# Patient Record
Sex: FEMALE | Race: BLACK OR AFRICAN AMERICAN | NOT HISPANIC OR LATINO | Employment: FULL TIME | URBAN - METROPOLITAN AREA
[De-identification: names, ages, dates, MRNs, and addresses within clinical notes are randomized per-mention and may not be internally consistent; named-entity substitution may affect disease eponyms.]

---

## 2019-05-16 ENCOUNTER — OFFICE VISIT (OUTPATIENT)
Dept: FAMILY MEDICINE | Facility: CLINIC | Age: 39
End: 2019-05-16
Payer: COMMERCIAL

## 2019-05-16 VITALS
WEIGHT: 258 LBS | SYSTOLIC BLOOD PRESSURE: 141 MMHG | HEART RATE: 90 BPM | OXYGEN SATURATION: 99 % | DIASTOLIC BLOOD PRESSURE: 93 MMHG | BODY MASS INDEX: 39.1 KG/M2 | HEIGHT: 68 IN | TEMPERATURE: 99.3 F

## 2019-05-16 DIAGNOSIS — M79.662 PAIN OF LEFT LOWER LEG: Primary | ICD-10-CM

## 2019-05-16 PROCEDURE — 99213 OFFICE O/P EST LOW 20 MIN: CPT | Performed by: FAMILY MEDICINE

## 2019-05-16 ASSESSMENT — PAIN SCALES - GENERAL: PAINLEVEL: NO PAIN (0)

## 2019-05-16 ASSESSMENT — MIFFLIN-ST. JEOR: SCORE: 1893.78
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
## 2019-05-16 NOTE — PATIENT INSTRUCTIONS
================================================================================  Normal Values   Blood pressure  <140/90 for most adults    <130/80 for some chronic diseases (ask your care team about yours)    BMI (body mass index)  18.5-25 kg/m2 (based on height and weight)     Thank you for visiting [facility]    Normal or non-critical lab and imaging results will be communicated to you by MyChart, letter or phone within 7 days.  If you do not hear from us within 10 days, please call the clinic. If you have a critical or abnormal lab result, we will notify you by phone as soon as possible.     If you have any questions regarding your visit please contact:     Team Comfort:   Clinic Hours Telephone Number   Dr. Davey Smith Dr. Vocal 7am-5pm  Monday - Friday [phone]  Juan RN  Charla RN  Cheyenne RN   Pharmacy 8:00am-8pm Monday-Friday    9am-5pm Saturday-Sunday [phone]   Urgent Care 11am-9pm Monday-Friday        9am-5pm Saturday-Sunday [phone]     After hours, weekend or if you need to make an appointment with your primary provider please call [phone].   After Hours nurse advise: call [facility]: [phone]    Medication Refills:  Call your pharmacy and they will forward the refill to us. Please allow 3 business days for your refills to be completed.
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
## 2019-05-22 ENCOUNTER — ANCILLARY PROCEDURE (OUTPATIENT)
Dept: GENERAL RADIOLOGY | Facility: CLINIC | Age: 39
End: 2019-05-22
Attending: ORTHOPAEDIC SURGERY
Payer: COMMERCIAL

## 2019-05-22 ENCOUNTER — OFFICE VISIT (OUTPATIENT)
Dept: ORTHOPEDICS | Facility: CLINIC | Age: 39
End: 2019-05-22
Payer: COMMERCIAL

## 2019-05-22 VITALS — OXYGEN SATURATION: 99 % | SYSTOLIC BLOOD PRESSURE: 148 MMHG | HEART RATE: 82 BPM | DIASTOLIC BLOOD PRESSURE: 86 MMHG

## 2019-05-22 DIAGNOSIS — M79.605 PAIN OF LEFT LOWER EXTREMITY: ICD-10-CM

## 2019-05-22 DIAGNOSIS — S82.401Q: Primary | ICD-10-CM

## 2019-05-22 DIAGNOSIS — S82.201Q: Primary | ICD-10-CM

## 2019-05-22 PROCEDURE — 73590 X-RAY EXAM OF LOWER LEG: CPT | Mod: LT

## 2019-05-22 PROCEDURE — 99243 OFF/OP CNSLTJ NEW/EST LOW 30: CPT | Performed by: ORTHOPAEDIC SURGERY

## 2019-05-22 ASSESSMENT — PAIN SCALES - GENERAL: PAINLEVEL: MODERATE PAIN (4)
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
## 2019-05-22 NOTE — NURSING NOTE
Agustina to follow up with Primary Care provider regarding elevated blood pressure.  Luke PERDOMO

## 2019-06-19 ENCOUNTER — HOSPITAL ENCOUNTER (OUTPATIENT)
Dept: NUCLEAR MEDICINE | Facility: CLINIC | Age: 39
Setting detail: NUCLEAR MEDICINE
Discharge: HOME | End: 2019-06-19
Attending: ORTHOPAEDIC SURGERY | Admitting: ORTHOPAEDIC SURGERY
Payer: COMMERCIAL

## 2019-06-19 ENCOUNTER — HOSPITAL ENCOUNTER (OUTPATIENT)
Dept: NUCLEAR MEDICINE | Facility: CLINIC | Age: 39
Setting detail: NUCLEAR MEDICINE
End: 2019-06-19
Attending: ORTHOPAEDIC SURGERY
Payer: COMMERCIAL

## 2019-06-19 DIAGNOSIS — M79.605 PAIN OF LEFT LOWER EXTREMITY: ICD-10-CM

## 2019-06-19 PROCEDURE — A9503 TC99M MEDRONATE: HCPCS | Performed by: ORTHOPAEDIC SURGERY

## 2019-06-19 PROCEDURE — 78315 BONE IMAGING 3 PHASE: CPT

## 2019-06-19 PROCEDURE — 34300033 ZZH RX 343: Performed by: ORTHOPAEDIC SURGERY

## 2019-06-19 RX ORDER — TC 99M MEDRONATE 20 MG/10ML
20-30 INJECTION, POWDER, LYOPHILIZED, FOR SOLUTION INTRAVENOUS ONCE
Status: COMPLETED | OUTPATIENT
Start: 2019-06-19 | End: 2019-06-19

## 2019-06-19 RX ADMIN — TC 99M MEDRONATE 24.1 MCI.: 20 INJECTION, POWDER, LYOPHILIZED, FOR SOLUTION INTRAVENOUS at 08:06

## 2020-03-11 ENCOUNTER — HEALTH MAINTENANCE LETTER (OUTPATIENT)
Age: 40
End: 2020-03-11

## 2020-03-19 ENCOUNTER — TELEPHONE (OUTPATIENT)
Dept: FAMILY MEDICINE | Facility: CLINIC | Age: 40
End: 2020-03-19

## 2020-03-19 NOTE — TELEPHONE ENCOUNTER
Patient contacted change appointment over to a telephone visit and get more information as the last office visit and images were done in May of 2019.    Patient picked up and handed phone .  states patient was seen by Dr Smith last year and referred to another doctor then that doctor referred patient to the [facility]. An MRI was completed 2 months there and they wanted Dr Smith to over the results and discuss plan. When trying to explain and get more information which facility they saw for the specialist and facility MRI was completed,  kept complaining about we referred patient and should know and have all the information already. Writer explained to  that we do not see referral to the [facility] or another facility to be able to further assist them. Writer also told  that the doctor who referred, which was the specialist would get the MRI not Dr Smith. Since they have been seeing the specialist until now, they should be able to assist our clinic and provide me the name and facility or specialist and MRI.  finally agreed to check and call clinic back.    Justyn Sargent CMA

## 2020-03-19 NOTE — TELEPHONE ENCOUNTER
Writer contacted patient and  picked up.  does not have information yet but will try to get information and get back to us so we can get records and go over everything better. Appointment today okay to cancel per patient. We will reschedule once get records.    Justyn Sargent CMA

## 2020-12-26 ENCOUNTER — RECORDS - HEALTHEAST (OUTPATIENT)
Dept: LAB | Facility: CLINIC | Age: 40
End: 2020-12-26

## 2020-12-26 LAB
SARS-COV-2 PCR COMMENT: NORMAL
SARS-COV-2 RNA SPEC QL NAA+PROBE: NEGATIVE
SARS-COV-2 VIRUS SPECIMEN SOURCE: NORMAL

## 2021-01-03 ENCOUNTER — HEALTH MAINTENANCE LETTER (OUTPATIENT)
Age: 41
End: 2021-01-03

## 2021-01-27 ENCOUNTER — OFFICE VISIT (OUTPATIENT)
Dept: FAMILY MEDICINE | Facility: CLINIC | Age: 41
End: 2021-01-27
Payer: COMMERCIAL

## 2021-01-27 VITALS
BODY MASS INDEX: 41.36 KG/M2 | HEART RATE: 94 BPM | OXYGEN SATURATION: 98 % | RESPIRATION RATE: 16 BRPM | WEIGHT: 272 LBS | TEMPERATURE: 98 F | DIASTOLIC BLOOD PRESSURE: 94 MMHG | SYSTOLIC BLOOD PRESSURE: 132 MMHG

## 2021-01-27 DIAGNOSIS — I10 ESSENTIAL HYPERTENSION: Primary | ICD-10-CM

## 2021-01-27 DIAGNOSIS — M79.662 PAIN OF LEFT LOWER LEG: ICD-10-CM

## 2021-01-27 DIAGNOSIS — E66.01 MORBID OBESITY (H): ICD-10-CM

## 2021-01-27 LAB
ANION GAP SERPL CALCULATED.3IONS-SCNC: 4 MMOL/L (ref 3–14)
BUN SERPL-MCNC: 14 MG/DL (ref 7–30)
CALCIUM SERPL-MCNC: 9.3 MG/DL (ref 8.5–10.1)
CHLORIDE SERPL-SCNC: 106 MMOL/L (ref 94–109)
CHOLEST SERPL-MCNC: 264 MG/DL
CO2 SERPL-SCNC: 30 MMOL/L (ref 20–32)
CREAT SERPL-MCNC: 0.72 MG/DL (ref 0.52–1.04)
CREAT UR-MCNC: 79 MG/DL
GFR SERPL CREATININE-BSD FRML MDRD: >90 ML/MIN/{1.73_M2}
GLUCOSE SERPL-MCNC: 95 MG/DL (ref 70–99)
HBA1C MFR BLD: 5.6 % (ref 0–5.6)
HDLC SERPL-MCNC: 68 MG/DL
LDLC SERPL CALC-MCNC: 178 MG/DL
MICROALBUMIN UR-MCNC: 10 MG/L
MICROALBUMIN/CREAT UR: 12.57 MG/G CR (ref 0–25)
NONHDLC SERPL-MCNC: 196 MG/DL
POTASSIUM SERPL-SCNC: 3.6 MMOL/L (ref 3.4–5.3)
SODIUM SERPL-SCNC: 140 MMOL/L (ref 133–144)
TRIGL SERPL-MCNC: 92 MG/DL

## 2021-01-27 PROCEDURE — 80061 LIPID PANEL: CPT | Performed by: PREVENTIVE MEDICINE

## 2021-01-27 PROCEDURE — 82043 UR ALBUMIN QUANTITATIVE: CPT | Performed by: PREVENTIVE MEDICINE

## 2021-01-27 PROCEDURE — 36415 COLL VENOUS BLD VENIPUNCTURE: CPT | Performed by: PREVENTIVE MEDICINE

## 2021-01-27 PROCEDURE — 83036 HEMOGLOBIN GLYCOSYLATED A1C: CPT | Performed by: PREVENTIVE MEDICINE

## 2021-01-27 PROCEDURE — 99214 OFFICE O/P EST MOD 30 MIN: CPT | Performed by: PREVENTIVE MEDICINE

## 2021-01-27 PROCEDURE — 80048 BASIC METABOLIC PNL TOTAL CA: CPT | Performed by: PREVENTIVE MEDICINE

## 2021-01-27 RX ORDER — AMLODIPINE BESYLATE 5 MG/1
5 TABLET ORAL DAILY
Qty: 90 TABLET | Refills: 0 | Status: SHIPPED | OUTPATIENT
Start: 2021-01-27 | End: 2021-04-16 | Stop reason: DRUGHIGH

## 2021-01-27 ASSESSMENT — PAIN SCALES - GENERAL: PAINLEVEL: NO PAIN (0)

## 2021-01-27 NOTE — RESULT ENCOUNTER NOTE
Agustina,     Urine sample is not showing any abnormal protein.     Please do not hesitate to call us at [phone] if you have any questions or concerns.    Thank you,    Aleyda Briggs MD MPH

## 2021-01-27 NOTE — RESULT ENCOUNTER NOTE
Agustina,    Three month glucose number is normal, you do not have diabetes. Other results are pending.     Please do not hesitate to call us at [phone] if you have any questions or concerns.    Thank you,    Aleyda Briggs MD MPH

## 2021-01-27 NOTE — RESULT ENCOUNTER NOTE
Dear Agustina Julian    Here are your cholesterol results:    Your LDL is: Lab Results       Component                Value               Date                       LDL                      178                 01/27/2021          Your LDL goal is to be less than 130  Your HDL is: Lab Results       Component                Value               Date                       HDL                      68                  01/27/2021           Goal HDL is Greater than 40 (for men) or 50 (for women).  Your Triglycerides are: Lab Results       Component                Value               Date                       TRIG                     92                  01/27/2021          Goal TRIGLYCERIDES are less than 150.       Here are some ways to improve your cholesterol without medication:    Try to get at least 45 minutes of aerobic exercise 5-6 days a week  Maintain a healthy body weight  Eat less saturated fats  Buy lean cuts of meat, reduce your portions of red meat or substitute poultry or fish  Avoid fried or fast foods that are high in fat  Eat more fruits and vegetables      We should recheck cholesterol in 6 months, if still high then medication may be needed.   Electrolytes, glucose and kidney function are normal.        Please do not hesitate to call us at [phone] if you have any questions or concerns.    Thank you,    Aleyda Briggs MD MPH
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
## 2021-02-02 ENCOUNTER — RECORDS - HEALTHEAST (OUTPATIENT)
Dept: LAB | Facility: CLINIC | Age: 41
End: 2021-02-02

## 2021-02-09 ENCOUNTER — RECORDS - HEALTHEAST (OUTPATIENT)
Dept: LAB | Facility: CLINIC | Age: 41
End: 2021-02-09
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
## 2021-02-10 ENCOUNTER — OFFICE VISIT (OUTPATIENT)
Dept: ORTHOPEDICS | Facility: CLINIC | Age: 41
End: 2021-02-10
Payer: COMMERCIAL

## 2021-02-10 VITALS — HEART RATE: 64 BPM | SYSTOLIC BLOOD PRESSURE: 142 MMHG | OXYGEN SATURATION: 100 % | DIASTOLIC BLOOD PRESSURE: 84 MMHG

## 2021-02-10 DIAGNOSIS — M79.605 PAIN OF LEFT LOWER EXTREMITY: ICD-10-CM

## 2021-02-10 DIAGNOSIS — M79.662 PAIN OF LEFT LOWER LEG: ICD-10-CM

## 2021-02-10 DIAGNOSIS — S82.202P CLOSED FRACTURE OF SHAFT OF LEFT TIBIA WITH MALUNION, UNSPECIFIED FRACTURE MORPHOLOGY, SUBSEQUENT ENCOUNTER: Primary | ICD-10-CM

## 2021-02-10 PROCEDURE — 99213 OFFICE O/P EST LOW 20 MIN: CPT | Performed by: ORTHOPAEDIC SURGERY

## 2021-02-10 ASSESSMENT — PAIN SCALES - GENERAL: PAINLEVEL: MODERATE PAIN (5)

## 2021-02-10 NOTE — NURSING NOTE
Agustina to follow up with Primary Care provider regarding elevated blood pressure.    Luke PERDOMO
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
## 2021-02-16 ENCOUNTER — RECORDS - HEALTHEAST (OUTPATIENT)
Dept: LAB | Facility: CLINIC | Age: 41
End: 2021-02-16

## 2021-02-17 ENCOUNTER — TELEPHONE (OUTPATIENT)
Dept: ORTHOPEDICS | Facility: CLINIC | Age: 41
End: 2021-02-17

## 2021-02-17 DIAGNOSIS — S82.202P CLOSED FRACTURE OF SHAFT OF LEFT TIBIA WITH MALUNION, UNSPECIFIED FRACTURE MORPHOLOGY, SUBSEQUENT ENCOUNTER: Primary | ICD-10-CM

## 2021-02-17 NOTE — TELEPHONE ENCOUNTER
[facility] Call Center    Phone Message    May a detailed message be left on voicemail: yes     Reason for Call: Other:  is no longer with us at the [facility]. Patient  would like to know if you had another recommendation on who pt can see for her leg pain.      Action Taken: Other: ORTHO    Travel Screening: Not Applicable
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
## 2021-02-23 ENCOUNTER — RECORDS - HEALTHEAST (OUTPATIENT)
Dept: LAB | Facility: CLINIC | Age: 41
End: 2021-02-23

## 2021-02-24 DIAGNOSIS — M79.662 PAIN OF LEFT LOWER LEG: Primary | ICD-10-CM

## 2021-03-16 ENCOUNTER — RECORDS - HEALTHEAST (OUTPATIENT)
Dept: LAB | Facility: CLINIC | Age: 41
End: 2021-03-16

## 2021-03-23 ENCOUNTER — OFFICE VISIT (OUTPATIENT)
Dept: ORTHOPEDICS | Facility: CLINIC | Age: 41
End: 2021-03-23
Payer: COMMERCIAL

## 2021-03-23 ENCOUNTER — TELEPHONE (OUTPATIENT)
Dept: ORTHOPEDICS | Facility: CLINIC | Age: 41
End: 2021-03-23

## 2021-03-23 ENCOUNTER — ANCILLARY PROCEDURE (OUTPATIENT)
Dept: GENERAL RADIOLOGY | Facility: CLINIC | Age: 41
End: 2021-03-23
Attending: ORTHOPAEDIC SURGERY
Payer: COMMERCIAL

## 2021-03-23 VITALS — BODY MASS INDEX: 36.37 KG/M2 | HEIGHT: 68 IN | WEIGHT: 240 LBS

## 2021-03-23 DIAGNOSIS — M79.662 PAIN OF LEFT LOWER LEG: ICD-10-CM

## 2021-03-23 DIAGNOSIS — S82.202P CLOSED FRACTURE OF SHAFT OF LEFT TIBIA WITH MALUNION, UNSPECIFIED FRACTURE MORPHOLOGY, SUBSEQUENT ENCOUNTER: Primary | ICD-10-CM

## 2021-03-23 PROCEDURE — 99204 OFFICE O/P NEW MOD 45 MIN: CPT | Performed by: ORTHOPAEDIC SURGERY

## 2021-03-23 PROCEDURE — 73590 X-RAY EXAM OF LOWER LEG: CPT | Mod: LT | Performed by: RADIOLOGY

## 2021-03-23 ASSESSMENT — MIFFLIN-ST. JEOR: SCORE: 1802.13

## 2021-03-23 ASSESSMENT — PAIN SCALES - GENERAL: PAINLEVEL: NO PAIN (0)

## 2021-03-23 NOTE — TELEPHONE ENCOUNTER
----- Message from Emely Lama ATC sent at 3/23/2021  9:09 AM CDT -----  Regarding: CT scan  Please call patient and help her get scheduled for CT scan within the next week. We need the scan prior to surgery.    Thanks Emely     
Alert and oriented, no focal deficits, no motor or sensory deficits.
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
## 2021-03-23 NOTE — NURSING NOTE
Teaching Flowsheet   Relevant Diagnosis: Left tibia/fibula malunion  Teaching Topic: Left tibia and fibula osteotomy.    Patient presents with her  Frankie. Health history positive only for HTN on amlodipine.      Person(s) involved in teaching:   Patient and      Motivation Level:  Asks Questions: Yes  Eager to Learn: Yes  Cooperative: Yes  Receptive (willing/able to accept information): Yes  Any cultural factors/Voodoo beliefs that may influence understanding or compliance? No     Patient and Family demonstrates understanding of the following:  Reason for the appointment, diagnosis and treatment plan: Yes  Knowledge of proper use of medications and conditions for which they are ordered (with special attention to potential side effects or drug interactions): Yes  Which situations necessitate calling provider and whom to contact: Yes     Teaching Concerns Addressed: They understand patient will need a preop exam within 30 days of the date of surgery. They understand she will need a COVID test 3-4 days prior to surgery and our COVID team will reach out to 7 days before to help her schedule.    Proper use and care of assistive devices, crutches (medical equip, care aids, etc.): Yes  Nutritional needs and diet plan: Yes  Pain management techniques: Yes  Wound Care: Yes  How and/when to access community resources: Yes     Instructional Materials Used/Given: Preoperative teaching packet, surgical soap x2.

## 2021-03-25 ENCOUNTER — ANCILLARY PROCEDURE (OUTPATIENT)
Dept: CT IMAGING | Facility: CLINIC | Age: 41
End: 2021-03-25
Attending: ORTHOPAEDIC SURGERY
Payer: COMMERCIAL

## 2021-03-25 DIAGNOSIS — S82.202P CLOSED FRACTURE OF SHAFT OF LEFT TIBIA WITH MALUNION, UNSPECIFIED FRACTURE MORPHOLOGY, SUBSEQUENT ENCOUNTER: ICD-10-CM

## 2021-03-25 PROCEDURE — 73700 CT LOWER EXTREMITY W/O DYE: CPT | Mod: LT | Performed by: RADIOLOGY

## 2021-03-30 ENCOUNTER — RECORDS - HEALTHEAST (OUTPATIENT)
Dept: LAB | Facility: CLINIC | Age: 41
End: 2021-03-30

## 2021-04-05 ENCOUNTER — PREP FOR PROCEDURE (OUTPATIENT)
Dept: ORTHOPEDICS | Facility: CLINIC | Age: 41
End: 2021-04-05

## 2021-04-05 ENCOUNTER — TELEPHONE (OUTPATIENT)
Dept: ORTHOPEDICS | Facility: CLINIC | Age: 41
End: 2021-04-05

## 2021-04-05 DIAGNOSIS — S82.202P CLOSED FRACTURE OF SHAFT OF LEFT TIBIA WITH MALUNION, UNSPECIFIED FRACTURE MORPHOLOGY, SUBSEQUENT ENCOUNTER: Primary | ICD-10-CM

## 2021-04-05 NOTE — TELEPHONE ENCOUNTER
Patient is scheduled for surgery with Dr. Santiago    Spoke with: Patient's Frankie    Date of Surgery: 5/12/21    Location: [facility]    Post ops: 2 & 6 weeks    Pre-op with surgeon (if applicable): Complete    H&P: Patient will schedule with PCP, Candi LEROY    Pre-procedure COVID-19 Test: Patient will schedule with Candi LEROY    Additional imaging/appointments: N/A    Surgery packet: Received in clinic     Additional comments: N/A

## 2021-04-06 ENCOUNTER — RECORDS - HEALTHEAST (OUTPATIENT)
Dept: LAB | Facility: CLINIC | Age: 41
End: 2021-04-06

## 2021-04-06 DIAGNOSIS — S82.202P CLOSED FRACTURE OF SHAFT OF LEFT TIBIA WITH MALUNION, UNSPECIFIED FRACTURE MORPHOLOGY, SUBSEQUENT ENCOUNTER: Primary | ICD-10-CM

## 2021-04-13 ENCOUNTER — RECORDS - HEALTHEAST (OUTPATIENT)
Dept: LAB | Facility: CLINIC | Age: 41
End: 2021-04-13

## 2021-04-15 ENCOUNTER — TELEPHONE (OUTPATIENT)
Dept: ORTHOPEDICS | Facility: CLINIC | Age: 41
End: 2021-04-15

## 2021-04-15 ENCOUNTER — TELEPHONE (OUTPATIENT)
Dept: FAMILY MEDICINE | Facility: CLINIC | Age: 41
End: 2021-04-15

## 2021-04-15 DIAGNOSIS — M79.662 PAIN OF LEFT LOWER LEG: Primary | ICD-10-CM

## 2021-04-15 DIAGNOSIS — S82.202P CLOSED FRACTURE OF SHAFT OF LEFT TIBIA WITH MALUNION, UNSPECIFIED FRACTURE MORPHOLOGY, SUBSEQUENT ENCOUNTER: ICD-10-CM

## 2021-04-15 NOTE — TELEPHONE ENCOUNTER
RN called patient and told patient the Covid 19 order is placed.    Shashank Cedeño RN      [facility] Call Center    Phone Message    May a detailed message be left on voicemail: yes     Reason for Call: Other: patient is needing Pre surgical Covid orders to be entered, so that they can make the appt to be tested prior to surgery. Please call Frankie at [phone] once completed.      Action Taken: Other: Ortho    Travel Screening: Not Applicable

## 2021-04-15 NOTE — TELEPHONE ENCOUNTER
Patient stated that she has a pre-op Friday and would like to get her covid-19 done, patient would like the orders place so she can schedule it.

## 2021-04-15 NOTE — TELEPHONE ENCOUNTER
Called and left a voicemail message for patient to contact her surgeon's office to make arrangements for the order for testing.  Lin Nielsen [facility]  2nd Floor  Primary Care

## 2021-04-16 ENCOUNTER — OFFICE VISIT (OUTPATIENT)
Dept: FAMILY MEDICINE | Facility: CLINIC | Age: 41
End: 2021-04-16
Payer: COMMERCIAL

## 2021-04-16 VITALS
HEART RATE: 95 BPM | DIASTOLIC BLOOD PRESSURE: 94 MMHG | OXYGEN SATURATION: 97 % | RESPIRATION RATE: 16 BRPM | HEIGHT: 68 IN | WEIGHT: 271 LBS | TEMPERATURE: 98.5 F | SYSTOLIC BLOOD PRESSURE: 148 MMHG | BODY MASS INDEX: 41.07 KG/M2

## 2021-04-16 DIAGNOSIS — Z01.818 PREOP GENERAL PHYSICAL EXAM: Primary | ICD-10-CM

## 2021-04-16 DIAGNOSIS — I10 ESSENTIAL HYPERTENSION: ICD-10-CM

## 2021-04-16 DIAGNOSIS — E66.01 MORBID OBESITY (H): ICD-10-CM

## 2021-04-16 DIAGNOSIS — S82.202P CLOSED FRACTURE OF SHAFT OF LEFT TIBIA WITH MALUNION, UNSPECIFIED FRACTURE MORPHOLOGY, SUBSEQUENT ENCOUNTER: ICD-10-CM

## 2021-04-16 LAB
ANION GAP SERPL CALCULATED.3IONS-SCNC: 2 MMOL/L (ref 3–14)
BUN SERPL-MCNC: 11 MG/DL (ref 7–30)
CALCIUM SERPL-MCNC: 9.3 MG/DL (ref 8.5–10.1)
CHLORIDE SERPL-SCNC: 103 MMOL/L (ref 94–109)
CO2 SERPL-SCNC: 29 MMOL/L (ref 20–32)
CREAT SERPL-MCNC: 0.7 MG/DL (ref 0.52–1.04)
GFR SERPL CREATININE-BSD FRML MDRD: >90 ML/MIN/{1.73_M2}
GLUCOSE SERPL-MCNC: 108 MG/DL (ref 70–99)
HGB BLD-MCNC: 12.9 G/DL (ref 11.7–15.7)
POTASSIUM SERPL-SCNC: 3.7 MMOL/L (ref 3.4–5.3)
SODIUM SERPL-SCNC: 134 MMOL/L (ref 133–144)

## 2021-04-16 PROCEDURE — 99214 OFFICE O/P EST MOD 30 MIN: CPT | Performed by: PREVENTIVE MEDICINE

## 2021-04-16 PROCEDURE — 80048 BASIC METABOLIC PNL TOTAL CA: CPT | Performed by: PREVENTIVE MEDICINE

## 2021-04-16 PROCEDURE — 36415 COLL VENOUS BLD VENIPUNCTURE: CPT | Performed by: PREVENTIVE MEDICINE

## 2021-04-16 PROCEDURE — 85018 HEMOGLOBIN: CPT | Performed by: PREVENTIVE MEDICINE

## 2021-04-16 RX ORDER — AMLODIPINE BESYLATE 5 MG/1
5 TABLET ORAL DAILY
Qty: 90 TABLET | Refills: 0 | Status: CANCELLED | OUTPATIENT
Start: 2021-04-16

## 2021-04-16 RX ORDER — AMLODIPINE BESYLATE 10 MG/1
10 TABLET ORAL DAILY
Qty: 90 TABLET | Refills: 1 | Status: SHIPPED | OUTPATIENT
Start: 2021-04-16 | End: 2021-10-20

## 2021-04-16 ASSESSMENT — MIFFLIN-ST. JEOR: SCORE: 1942.75

## 2021-04-16 ASSESSMENT — PAIN SCALES - GENERAL: PAINLEVEL: NO PAIN (0)

## 2021-04-16 NOTE — RESULT ENCOUNTER NOTE
Agustina,     Electrolytes, non fasting glucose and kidney function are normal.     Please do not hesitate to call us at [phone] if you have any questions or concerns.    Thank you,    Aleyda Briggs MD MPH

## 2021-04-16 NOTE — PATIENT INSTRUCTIONS
At [facility], we strive to deliver an exceptional experience to you, every time we see you. If you receive a survey, please complete it as we do value your feedback.  If you have MyChart, you can expect to receive results automatically within 24 hours of their completion.  Your provider will send a note interpreting your results as well.   If you do not have MyChart, you should receive your results in about a week by mail.    Your care team:                            Family Medicine Internal Medicine   MD Anish Portillo MD Shantel Branch-Fleming, MD Srinivasa Vaka, MD Katya Belousova, PAZULY Barone, APRN CNP    Michelet Smith, MD Pediatrics   Kaushik Stephen, PAZULY Mccoy, CNP MD Shannan Dupree APRN CNP   MD Cammie Khan MD Deborah Mielke, MD Silke Escobar, APRN [facility]      Clinic hours: Monday - Thursday 7 am-6 pm; Fridays 7 am-5 pm.   Urgent care: Monday - Friday 10 am- 8 pm; Saturday and Sunday 9 am-5 pm.    Clinic: [phone]       [facility]: Monday - Thursday 8 am - 7 pm; Friday 8 am - 6 pm  [facility]: [phone]     Use [email or web address] for 24/7 diagnosis and treatment of dozens of conditions.    Preparing for Your Surgery  Getting started  A nurse will call you to review your health history and instructions. They will give you an arrival time based on your scheduled surgery time.  Please be ready to share the following:    Your doctor's clinic name and phone number    Your medical, surgical and anesthesia history    A list of allergies and sensitivities    A list of medicines, including herbal treatments and over-the-counter drugs    Whether the patient has a legal guardian (ask how to send us the papers in advance)  If you have a child who's having surgery, please ask for a copy of Preparing for Your Child's Surgery.    Preparing for  surgery    Within 30 days of surgery: Have a pre-op exam (sometimes called an H&P, or History and Physical). This can be done at a clinic or pre-operative center.  ? If you're having a , you may not need this exam. Talk to your care team    At your pre-op exam, talk to your care team about all medicines you take. If you need to stop any medicines before surgery, ask when to start taking them again.  ? We do this for your safety. Many medicines can make you bleed too much during surgery. Some change how well surgery (anesthesia) drugs work.    Call your insurance company to let them know you're having surgery. (If you don't have insurance, call [phone].)    Call your clinic if there's any change in your health. This includes signs of a cold or flu (sore throat, runny nose, cough, rash, fever). It also includes a scrape or scratch near the surgery site.    If you have questions on the day of surgery, call your hospital or surgery center.  Eating and drinking guidelines  For your safety: Unless your surgeon tells you otherwise, follow the guidelines below.    Eat and drink as usual until 8 hours before surgery. After that, no food or milk.    Drink clear liquids until 2 hours before surgery. These are liquids you can see through, like water, Gatorade and Propel Water. You may also have black coffee and tea (no cream or milk).    Nothing by mouth within 2 hours of surgery. This includes gum, candy and breath mints.    If you drink, stop drinking alcohol the night before surgery.    If your care team tells you to take medicine on the morning of surgery, it's okay to take it with a sip of water.  Preventing infection    Shower or bathe the night before and morning of your surgery. Follow the instructions your clinic gave you. (If no instructions, use regular soap.)    Don't shave or clip hair near your surgery site. We'll remove the hair if needed.    Don't smoke or vape the morning of surgery. You may chew  nicotine gum up to 2 hours before surgery. A nicotine patch is okay.  ? Note: Some surgeries require you to completely quit smoking and nicotine. Check with your surgeon.    Your care team will make every effort to keep you safe from infection. We will:  ? Clean our hands often with soap and water (or an alcohol-based hand rub).  ? Clean the skin at your surgery site with a special soap that kills germs.  ? Give you a special gown to keep you warm. (Cold raises the risk of infection.)  ? Wear special hair covers, masks, gowns and gloves during surgery.  ? Give antibiotic medicine, if prescribed. Not all surgeries need antibiotics.  What to bring on the day of surgery    Photo ID and insurance card    Copy of your health care directive, if you have one    Glasses and hearing aides (bring cases)  ? You can't wear contacts during surgery    Inhaler and eye drops, if you use them (tell us about these when you arrive)    CPAP machine or breathing device, if you use them    A few personal items, if spending the night    If you have . . .  ? A pacemaker or ICD (cardiac defibrillator): Bring the ID card.  ? An implanted stimulator: Bring the remote control.  ? A legal guardian: Bring a copy of the certified (court-stamped) guardianship papers.  Please remove any jewelry, including body piercings. Leave jewelry and other valuables at home.  If you're going home the day of surgery  Important: If you don't follow the rules below, we must cancel your surgery.     Arrange for someone to drive you home after surgery. You may not drive, take a taxi or take public transportation by yourself (unless you'll have local anesthesia only).    Arrange for a responsible adult to stay with you overnight. If you don't, we may keep you in the hospital overnight, and you may need to pay the costs yourself.  Questions?   If you have any questions for your care team, list them here:  _________________________________________________________________________________________________________________________________________________________________________________________________________________________________________________________________________________________________________________________  For informational purposes only. Not to replace the advice of your health care provider. Copyright   2003, 2019 [facility]. All rights reserved. Clinically reviewed by Fawn Almodovar MD. SMARTworks 211406 - REV 4/20.
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
## 2021-04-16 NOTE — RESULT ENCOUNTER NOTE
Agustina,     Hemoglobin is normal, other results are pending.     Please do not hesitate to call us at [phone] if you have any questions or concerns.    Thank you,    Aleyda Briggs MD MPH
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
## 2021-04-20 ENCOUNTER — RECORDS - HEALTHEAST (OUTPATIENT)
Dept: LAB | Facility: CLINIC | Age: 41
End: 2021-04-20

## 2021-04-25 ENCOUNTER — HEALTH MAINTENANCE LETTER (OUTPATIENT)
Age: 41
End: 2021-04-25

## 2021-04-27 ENCOUNTER — RECORDS - HEALTHEAST (OUTPATIENT)
Dept: LAB | Facility: CLINIC | Age: 41
End: 2021-04-27

## 2021-05-03 ENCOUNTER — RECORDS - HEALTHEAST (OUTPATIENT)
Dept: LAB | Facility: CLINIC | Age: 41
End: 2021-05-03

## 2021-05-10 DIAGNOSIS — M79.662 PAIN OF LEFT LOWER LEG: ICD-10-CM

## 2021-05-10 DIAGNOSIS — S82.202P CLOSED FRACTURE OF SHAFT OF LEFT TIBIA WITH MALUNION, UNSPECIFIED FRACTURE MORPHOLOGY, SUBSEQUENT ENCOUNTER: ICD-10-CM

## 2021-05-10 LAB
LABORATORY COMMENT REPORT: NORMAL
SARS-COV-2 RNA RESP QL NAA+PROBE: NEGATIVE
SARS-COV-2 RNA RESP QL NAA+PROBE: NORMAL
SPECIMEN SOURCE: NORMAL
SPECIMEN SOURCE: NORMAL

## 2021-05-10 PROCEDURE — U0005 INFEC AGEN DETEC AMPLI PROBE: HCPCS | Performed by: ORTHOPAEDIC SURGERY

## 2021-05-10 PROCEDURE — U0003 INFECTIOUS AGENT DETECTION BY NUCLEIC ACID (DNA OR RNA); SEVERE ACUTE RESPIRATORY SYNDROME CORONAVIRUS 2 (SARS-COV-2) (CORONAVIRUS DISEASE [COVID-19]), AMPLIFIED PROBE TECHNIQUE, MAKING USE OF HIGH THROUGHPUT TECHNOLOGIES AS DESCRIBED BY CMS-2020-01-R: HCPCS | Performed by: ORTHOPAEDIC SURGERY

## 2021-05-11 ENCOUNTER — ANESTHESIA EVENT (OUTPATIENT)
Dept: SURGERY | Facility: AMBULATORY SURGERY CENTER | Age: 41
End: 2021-05-11

## 2021-05-12 ENCOUNTER — ANESTHESIA (OUTPATIENT)
Dept: SURGERY | Facility: AMBULATORY SURGERY CENTER | Age: 41
End: 2021-05-12

## 2021-05-12 ENCOUNTER — ANCILLARY PROCEDURE (OUTPATIENT)
Dept: RADIOLOGY | Facility: AMBULATORY SURGERY CENTER | Age: 41
End: 2021-05-12
Attending: ORTHOPAEDIC SURGERY
Payer: COMMERCIAL

## 2021-05-12 ENCOUNTER — HOSPITAL ENCOUNTER (OUTPATIENT)
Facility: AMBULATORY SURGERY CENTER | Age: 41
Discharge: HOME | End: 2021-05-12
Attending: ORTHOPAEDIC SURGERY | Admitting: ORTHOPAEDIC SURGERY
Payer: COMMERCIAL

## 2021-05-12 VITALS
HEIGHT: 68 IN | WEIGHT: 257 LBS | RESPIRATION RATE: 18 BRPM | HEART RATE: 75 BPM | OXYGEN SATURATION: 100 % | TEMPERATURE: 97.1 F | BODY MASS INDEX: 38.95 KG/M2 | DIASTOLIC BLOOD PRESSURE: 92 MMHG | SYSTOLIC BLOOD PRESSURE: 138 MMHG

## 2021-05-12 DIAGNOSIS — R52 PAIN: ICD-10-CM

## 2021-05-12 DIAGNOSIS — S82.202P CLOSED FRACTURE OF SHAFT OF LEFT TIBIA WITH MALUNION, UNSPECIFIED FRACTURE MORPHOLOGY, SUBSEQUENT ENCOUNTER: ICD-10-CM

## 2021-05-12 DIAGNOSIS — M25.572 ACUTE LEFT ANKLE PAIN: Primary | ICD-10-CM

## 2021-05-12 LAB
HCG UR QL: NEGATIVE
INTERNAL QC OK POCT: YES

## 2021-05-12 PROCEDURE — 81025 URINE PREGNANCY TEST: CPT | Performed by: PATHOLOGY

## 2021-05-12 PROCEDURE — 27720 REPAIR OF TIBIA: CPT | Mod: LT

## 2021-05-12 RX ORDER — DEXAMETHASONE SODIUM PHOSPHATE 4 MG/ML
INJECTION, SOLUTION INTRA-ARTICULAR; INTRALESIONAL; INTRAMUSCULAR; INTRAVENOUS; SOFT TISSUE PRN
Status: DISCONTINUED | OUTPATIENT
Start: 2021-05-12 | End: 2021-05-12

## 2021-05-12 RX ORDER — ONDANSETRON 4 MG/1
4 TABLET, ORALLY DISINTEGRATING ORAL EVERY 30 MIN PRN
Status: DISCONTINUED | OUTPATIENT
Start: 2021-05-12 | End: 2021-05-13 | Stop reason: HOSPADM

## 2021-05-12 RX ORDER — CEFAZOLIN SODIUM 2 G/50ML
2 SOLUTION INTRAVENOUS SEE ADMIN INSTRUCTIONS
Status: DISCONTINUED | OUTPATIENT
Start: 2021-05-12 | End: 2021-05-12 | Stop reason: HOSPADM

## 2021-05-12 RX ORDER — GABAPENTIN 300 MG/1
300 CAPSULE ORAL ONCE
Status: COMPLETED | OUTPATIENT
Start: 2021-05-12 | End: 2021-05-12

## 2021-05-12 RX ORDER — SODIUM CHLORIDE, SODIUM LACTATE, POTASSIUM CHLORIDE, CALCIUM CHLORIDE 600; 310; 30; 20 MG/100ML; MG/100ML; MG/100ML; MG/100ML
INJECTION, SOLUTION INTRAVENOUS CONTINUOUS
Status: DISCONTINUED | OUTPATIENT
Start: 2021-05-12 | End: 2021-05-12 | Stop reason: HOSPADM

## 2021-05-12 RX ORDER — FENTANYL CITRATE 50 UG/ML
25-50 INJECTION, SOLUTION INTRAMUSCULAR; INTRAVENOUS
Status: DISCONTINUED | OUTPATIENT
Start: 2021-05-12 | End: 2021-05-12 | Stop reason: HOSPADM

## 2021-05-12 RX ORDER — NALOXONE HYDROCHLORIDE 0.4 MG/ML
0.2 INJECTION, SOLUTION INTRAMUSCULAR; INTRAVENOUS; SUBCUTANEOUS
Status: DISCONTINUED | OUTPATIENT
Start: 2021-05-12 | End: 2021-05-13 | Stop reason: HOSPADM

## 2021-05-12 RX ORDER — BUPIVACAINE HYDROCHLORIDE 2.5 MG/ML
INJECTION, SOLUTION EPIDURAL; INFILTRATION; INTRACAUDAL
Status: COMPLETED | OUTPATIENT
Start: 2021-05-12 | End: 2021-05-12

## 2021-05-12 RX ORDER — OXYCODONE HYDROCHLORIDE 5 MG/1
5-10 TABLET ORAL EVERY 4 HOURS PRN
Qty: 26 TABLET | Refills: 0 | Status: SHIPPED | OUTPATIENT
Start: 2021-05-12 | End: 2021-05-18

## 2021-05-12 RX ORDER — ACETAMINOPHEN 325 MG/1
975 TABLET ORAL ONCE
Status: COMPLETED | OUTPATIENT
Start: 2021-05-12 | End: 2021-05-12

## 2021-05-12 RX ORDER — NALOXONE HYDROCHLORIDE 0.4 MG/ML
0.4 INJECTION, SOLUTION INTRAMUSCULAR; INTRAVENOUS; SUBCUTANEOUS
Status: DISCONTINUED | OUTPATIENT
Start: 2021-05-12 | End: 2021-05-13 | Stop reason: HOSPADM

## 2021-05-12 RX ORDER — CEFAZOLIN SODIUM 2 G/50ML
2 SOLUTION INTRAVENOUS
Status: COMPLETED | OUTPATIENT
Start: 2021-05-12 | End: 2021-05-12

## 2021-05-12 RX ORDER — NALOXONE HYDROCHLORIDE 0.4 MG/ML
0.2 INJECTION, SOLUTION INTRAMUSCULAR; INTRAVENOUS; SUBCUTANEOUS
Status: DISCONTINUED | OUTPATIENT
Start: 2021-05-12 | End: 2021-05-12 | Stop reason: HOSPADM

## 2021-05-12 RX ORDER — KETAMINE HYDROCHLORIDE 10 MG/ML
INJECTION, SOLUTION INTRAMUSCULAR; INTRAVENOUS PRN
Status: DISCONTINUED | OUTPATIENT
Start: 2021-05-12 | End: 2021-05-12

## 2021-05-12 RX ORDER — KETOROLAC TROMETHAMINE 30 MG/ML
INJECTION, SOLUTION INTRAMUSCULAR; INTRAVENOUS PRN
Status: DISCONTINUED | OUTPATIENT
Start: 2021-05-12 | End: 2021-05-12

## 2021-05-12 RX ORDER — HYDROXYZINE HYDROCHLORIDE 25 MG/1
25 TABLET, FILM COATED ORAL
Status: COMPLETED | OUTPATIENT
Start: 2021-05-12 | End: 2021-05-12

## 2021-05-12 RX ORDER — ONDANSETRON 2 MG/ML
INJECTION INTRAMUSCULAR; INTRAVENOUS PRN
Status: DISCONTINUED | OUTPATIENT
Start: 2021-05-12 | End: 2021-05-12

## 2021-05-12 RX ORDER — FENTANYL CITRATE 50 UG/ML
25-50 INJECTION, SOLUTION INTRAMUSCULAR; INTRAVENOUS
Status: DISCONTINUED | OUTPATIENT
Start: 2021-05-12 | End: 2021-05-13 | Stop reason: HOSPADM

## 2021-05-12 RX ORDER — ACETAMINOPHEN 325 MG/1
650 TABLET ORAL
Status: DISCONTINUED | OUTPATIENT
Start: 2021-05-12 | End: 2021-05-13 | Stop reason: HOSPADM

## 2021-05-12 RX ORDER — SODIUM CHLORIDE, SODIUM LACTATE, POTASSIUM CHLORIDE, CALCIUM CHLORIDE 600; 310; 30; 20 MG/100ML; MG/100ML; MG/100ML; MG/100ML
INJECTION, SOLUTION INTRAVENOUS CONTINUOUS PRN
Status: DISCONTINUED | OUTPATIENT
Start: 2021-05-12 | End: 2021-05-12

## 2021-05-12 RX ORDER — OXYCODONE HYDROCHLORIDE 5 MG/1
5 TABLET ORAL EVERY 4 HOURS PRN
Status: DISCONTINUED | OUTPATIENT
Start: 2021-05-12 | End: 2021-05-13 | Stop reason: HOSPADM

## 2021-05-12 RX ORDER — SODIUM CHLORIDE, SODIUM LACTATE, POTASSIUM CHLORIDE, CALCIUM CHLORIDE 600; 310; 30; 20 MG/100ML; MG/100ML; MG/100ML; MG/100ML
INJECTION, SOLUTION INTRAVENOUS CONTINUOUS
Status: DISCONTINUED | OUTPATIENT
Start: 2021-05-12 | End: 2021-05-13 | Stop reason: HOSPADM

## 2021-05-12 RX ORDER — PROPOFOL 10 MG/ML
INJECTION, EMULSION INTRAVENOUS PRN
Status: DISCONTINUED | OUTPATIENT
Start: 2021-05-12 | End: 2021-05-12

## 2021-05-12 RX ORDER — NALOXONE HYDROCHLORIDE 0.4 MG/ML
0.4 INJECTION, SOLUTION INTRAMUSCULAR; INTRAVENOUS; SUBCUTANEOUS
Status: DISCONTINUED | OUTPATIENT
Start: 2021-05-12 | End: 2021-05-12 | Stop reason: HOSPADM

## 2021-05-12 RX ORDER — BUPIVACAINE HYDROCHLORIDE 5 MG/ML
INJECTION, SOLUTION PERINEURAL PRN
Status: DISCONTINUED | OUTPATIENT
Start: 2021-05-12 | End: 2021-05-12 | Stop reason: HOSPADM

## 2021-05-12 RX ORDER — PROPOFOL 10 MG/ML
INJECTION, EMULSION INTRAVENOUS CONTINUOUS PRN
Status: DISCONTINUED | OUTPATIENT
Start: 2021-05-12 | End: 2021-05-12

## 2021-05-12 RX ORDER — FLUMAZENIL 0.1 MG/ML
0.2 INJECTION, SOLUTION INTRAVENOUS
Status: DISCONTINUED | OUTPATIENT
Start: 2021-05-12 | End: 2021-05-12 | Stop reason: HOSPADM

## 2021-05-12 RX ORDER — FENTANYL CITRATE 50 UG/ML
INJECTION, SOLUTION INTRAMUSCULAR; INTRAVENOUS PRN
Status: DISCONTINUED | OUTPATIENT
Start: 2021-05-12 | End: 2021-05-12

## 2021-05-12 RX ORDER — LIDOCAINE HYDROCHLORIDE 20 MG/ML
INJECTION, SOLUTION INFILTRATION; PERINEURAL PRN
Status: DISCONTINUED | OUTPATIENT
Start: 2021-05-12 | End: 2021-05-12

## 2021-05-12 RX ORDER — ONDANSETRON 2 MG/ML
4 INJECTION INTRAMUSCULAR; INTRAVENOUS EVERY 30 MIN PRN
Status: DISCONTINUED | OUTPATIENT
Start: 2021-05-12 | End: 2021-05-13 | Stop reason: HOSPADM

## 2021-05-12 RX ORDER — OXYCODONE HYDROCHLORIDE 5 MG/1
5 TABLET ORAL
Status: COMPLETED | OUTPATIENT
Start: 2021-05-12 | End: 2021-05-12

## 2021-05-12 RX ORDER — GABAPENTIN 100 MG/1
100 CAPSULE ORAL 3 TIMES DAILY
Qty: 42 CAPSULE | Refills: 0 | Status: SHIPPED | OUTPATIENT
Start: 2021-05-12 | End: 2021-12-08

## 2021-05-12 RX ORDER — MEPERIDINE HYDROCHLORIDE 25 MG/ML
12.5 INJECTION INTRAMUSCULAR; INTRAVENOUS; SUBCUTANEOUS
Status: DISCONTINUED | OUTPATIENT
Start: 2021-05-12 | End: 2021-05-13 | Stop reason: HOSPADM

## 2021-05-12 RX ORDER — LIDOCAINE 40 MG/G
CREAM TOPICAL
Status: DISCONTINUED | OUTPATIENT
Start: 2021-05-12 | End: 2021-05-12 | Stop reason: HOSPADM

## 2021-05-12 RX ORDER — HYDROXYZINE HYDROCHLORIDE 25 MG/1
25 TABLET, FILM COATED ORAL EVERY 8 HOURS PRN
Qty: 30 TABLET | Refills: 0 | Status: SHIPPED | OUTPATIENT
Start: 2021-05-12 | End: 2021-12-08

## 2021-05-12 RX ORDER — GLYCOPYRROLATE 0.2 MG/ML
INJECTION, SOLUTION INTRAMUSCULAR; INTRAVENOUS PRN
Status: DISCONTINUED | OUTPATIENT
Start: 2021-05-12 | End: 2021-05-12

## 2021-05-12 RX ADMIN — KETAMINE HYDROCHLORIDE 30 MG: 10 INJECTION, SOLUTION INTRAMUSCULAR; INTRAVENOUS at 08:23

## 2021-05-12 RX ADMIN — Medication 100 MCG: at 09:16

## 2021-05-12 RX ADMIN — PROPOFOL: 10 INJECTION, EMULSION INTRAVENOUS at 09:56

## 2021-05-12 RX ADMIN — ACETAMINOPHEN 975 MG: 325 TABLET ORAL at 07:35

## 2021-05-12 RX ADMIN — Medication 100 MCG: at 09:53

## 2021-05-12 RX ADMIN — FENTANYL CITRATE 25 MCG: 50 INJECTION, SOLUTION INTRAMUSCULAR; INTRAVENOUS at 10:42

## 2021-05-12 RX ADMIN — KETAMINE HYDROCHLORIDE 20 MG: 10 INJECTION, SOLUTION INTRAMUSCULAR; INTRAVENOUS at 08:38

## 2021-05-12 RX ADMIN — Medication 100 MCG: at 09:32

## 2021-05-12 RX ADMIN — FENTANYL CITRATE 50 MCG: 50 INJECTION, SOLUTION INTRAMUSCULAR; INTRAVENOUS at 08:48

## 2021-05-12 RX ADMIN — FENTANYL CITRATE 50 MCG: 50 INJECTION, SOLUTION INTRAMUSCULAR; INTRAVENOUS at 09:56

## 2021-05-12 RX ADMIN — PROPOFOL 200 MCG/KG/MIN: 10 INJECTION, EMULSION INTRAVENOUS at 08:22

## 2021-05-12 RX ADMIN — OXYCODONE HYDROCHLORIDE 5 MG: 5 TABLET ORAL at 10:39

## 2021-05-12 RX ADMIN — SODIUM CHLORIDE, SODIUM LACTATE, POTASSIUM CHLORIDE, CALCIUM CHLORIDE: 600; 310; 30; 20 INJECTION, SOLUTION INTRAVENOUS at 08:13

## 2021-05-12 RX ADMIN — KETOROLAC TROMETHAMINE 30 MG: 30 INJECTION, SOLUTION INTRAMUSCULAR; INTRAVENOUS at 09:45

## 2021-05-12 RX ADMIN — DEXAMETHASONE SODIUM PHOSPHATE 4 MG: 4 INJECTION, SOLUTION INTRA-ARTICULAR; INTRALESIONAL; INTRAMUSCULAR; INTRAVENOUS; SOFT TISSUE at 08:23

## 2021-05-12 RX ADMIN — ONDANSETRON 4 MG: 2 INJECTION INTRAMUSCULAR; INTRAVENOUS at 08:23

## 2021-05-12 RX ADMIN — OXYCODONE HYDROCHLORIDE 5 MG: 5 TABLET ORAL at 11:41

## 2021-05-12 RX ADMIN — HYDROXYZINE HYDROCHLORIDE 25 MG: 25 TABLET, FILM COATED ORAL at 11:41

## 2021-05-12 RX ADMIN — CEFAZOLIN SODIUM 2 G: 2 SOLUTION INTRAVENOUS at 08:28

## 2021-05-12 RX ADMIN — BUPIVACAINE HYDROCHLORIDE 10 ML: 2.5 INJECTION, SOLUTION EPIDURAL; INFILTRATION; INTRACAUDAL at 08:29

## 2021-05-12 RX ADMIN — GABAPENTIN 300 MG: 300 CAPSULE ORAL at 07:35

## 2021-05-12 RX ADMIN — PROPOFOL: 10 INJECTION, EMULSION INTRAVENOUS at 09:18

## 2021-05-12 RX ADMIN — GLYCOPYRROLATE 0.2 MG: 0.2 INJECTION, SOLUTION INTRAMUSCULAR; INTRAVENOUS at 08:16

## 2021-05-12 RX ADMIN — LIDOCAINE HYDROCHLORIDE 70 MG: 20 INJECTION, SOLUTION INFILTRATION; PERINEURAL at 08:22

## 2021-05-12 RX ADMIN — PROPOFOL 200 MG: 10 INJECTION, EMULSION INTRAVENOUS at 08:22

## 2021-05-12 ASSESSMENT — MIFFLIN-ST. JEOR: SCORE: 1879.24

## 2021-05-12 NOTE — PROGRESS NOTES
Patient received left side Adductor nerve block  with Exparel. Versed 2mg given. Tolerated procedure well.

## 2021-05-12 NOTE — ANESTHESIA PREPROCEDURE EVALUATION
Anesthesia Pre-Procedure Evaluation    Patient: Agustina Julian   MRN: [id number] : 1980        Preoperative Diagnosis: Closed fracture of shaft of left tibia with malunion, unspecified fracture morphology, subsequent encounter [S82.P]   Procedure : Procedure(s):  Left tibia and fibula osteotomy     No past medical history on file.   Past Surgical History:   Procedure Laterality Date     OPEN REDUCTION INTERNAL FIXATION TIBIA Left     [facility]      No Known Allergies   Social History     Tobacco Use     Smoking status: Never Smoker     Smokeless tobacco: Never Used   Substance Use Topics     Alcohol use: Not Currently      Wt Readings from Last 1 Encounters:   21 116.6 kg (257 lb)        Anesthesia Evaluation   Pt has not had prior anesthetic         ROS/MED HX  ENT/Pulmonary:  - neg pulmonary ROS     Neurologic:  - neg neurologic ROS     Cardiovascular:  - neg cardiovascular ROS     METS/Exercise Tolerance:     Hematologic:  - neg hematologic  ROS     Musculoskeletal:       GI/Hepatic:  - neg GI/hepatic ROS     Renal/Genitourinary:  - neg Renal ROS     Endo:     (+) Obesity,     Psychiatric/Substance Use:  - neg psychiatric ROS     Infectious Disease:       Malignancy:       Other:            Physical Exam    Airway  airway exam normal           Respiratory Devices and Support         Dental  no notable dental history         Cardiovascular   cardiovascular exam normal          Pulmonary   pulmonary exam normal                OUTSIDE LABS:  CBC:   Lab Results   Component Value Date    HGB 12.9 2021     BMP:   Lab Results   Component Value Date     2021     2021    POTASSIUM 3.7 2021    POTASSIUM 3.6 2021    CHLORIDE 103 2021    CHLORIDE 106 2021    CO2 29 2021    CO2 30 2021    BUN 11 2021    BUN 14 2021    CR 0.70 2021    CR 0.72 2021     (H) 2021    GLC 95 2021      COAGS: No results found for: PTT, INR, FIBR  POC:   Lab Results   Component Value Date    HCG Negative 05/12/2021     HEPATIC: No results found for: ALBUMIN, PROTTOTAL, ALT, AST, GGT, ALKPHOS, BILITOTAL, BILIDIRECT, SUKHWINDER  OTHER:   Lab Results   Component Value Date    A1C 5.6 01/27/2021    GUERO 9.3 04/16/2021       Anesthesia Plan    ASA Status:  2   NPO Status:  NPO Appropriate    Anesthesia Type: General.     - Airway: LMA   Induction: Intravenous.   Maintenance: TIVA.        Consents    Anesthesia Plan(s) and associated risks, benefits, and realistic alternatives discussed. Questions answered and patient/representative(s) expressed understanding.     - Discussed with:  Patient         Postoperative Care    Pain management: Oral pain medications, Peripheral nerve block (Single Shot).   PONV prophylaxis: Ondansetron (or other 5HT-3), Dexamethasone or Solumedrol     Comments:    Discussed with Patient Off-Label use of Liposomal Bupivacaine (Exparel) for Nerve Block.    Relevant risks & benefits were discussed with patient.    All questions were answered and there was agreement to proceed.    Patient signed Off-Label Use of Exparel Consent Form.    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               Jorje Gracia MD, MD

## 2021-05-12 NOTE — BRIEF OP NOTE
"[facility]    Brief Operative Note    Pre-operative diagnosis: Closed fracture of shaft of left tibia with malunion, unspecified fracture morphology, subsequent encounter [W56.244V]  Post-operative diagnosis Same as pre-operative diagnosis    Procedure: Procedure(s):  Left tibia and fibula osteotomy  Surgeon: Surgeon(s) and Role:     * Stiven Santiago MD - Primary     * Anna Pompa PA-C - Assisting  Anesthesia: Choice   Estimated blood loss: Less than 50 ml  Drains: None  Specimens: * No specimens in log *  Findings:   None.  Complications: None.  Implants:   Implant Name Type Inv. Item Serial No.  Lot No. LRB No. Used Action   Pin Lillian 2.0 mm 9\" smooth       Left 2 Used as a Supply   Basic Fragment 5 hole Plate    MANDY  Left 1 Implanted and Explanted   Basic Fragment 6 hole Plate    MANDY  Left 1 Implanted   Basic Fragment 46mm x 4.5 Cortical Screw    MANDY  Left 1 Implanted   Basi Fragment 64mm x 4.5 Cortical Screw    MANDY  Left 2 Implanted   Basi Fragment 40mm x 4.5 Cortical Screw    MANDY  Left 1 Implanted   Basi Fragment 52mm x 4.5 Cortical Screw    MANDY  Left 1 Implanted   Basi Fragment 54mm x 4.5 Cortical Screw    MANDY  Left 1 Implanted   Basi Fragment 56mm x 4.5 Cortical Screw    MANDY  Left 1 Implanted and Explanted   Basi Fragment 58mm x 4.5 Cortical Screw    MANDY  Left 1 Implanted and Explanted   Basi Fragment 62mm x 4.5 Cortical Screw    MANDY  Left 1 Implanted and Explanted   Basi Fragment 68mm x 4.5 Cortical Screw    MANDY  Left 1 Implanted and Explanted         Plan:  Same Day surgery discharge to home once criteria met.  CAM boot to remain on left  lower extremity and NWB at all times.  Oxycodone, hydrozyxine, gabapentin and tylenol for pain.  No dressing change on own.  Leave dressing on until 2 weeks follow up appointment.  F/U in clinic in 2 weeks    I was asked by Dr. Santiago to assist with surgery. I " positioned and prepped the patient. I retracted soft tissue.   I suctioned fluids when needed. I provided traction for dissection. I helped to ligate blood vessels. I helped Dr. Santiago identify and protect important structures. The procedure was medically necessary for an assistant because Dr. Santiago needed the operative exposure and assistance that I provided. This allowed him to safely and efficiently operate. It was also important that I help ligate blood vessels to maintain hemostasis and reduce the bleeding risk. I helped with the closure of the operative incisions as well as helping with the boot/cast/splint.  The assistance that I provided reduced operative time which meant less general anesthetic for the patient. No qualified residents were available to assist.    Anna Pompa PA-C

## 2021-05-12 NOTE — ANESTHESIA POSTPROCEDURE EVALUATION
Patient: Agustina Julian    Procedure(s):  Left tibia and fibula osteotomy    Diagnosis:Closed fracture of shaft of left tibia with malunion, unspecified fracture morphology, subsequent encounter [S82.202P]  Diagnosis Additional Information: No value filed.    Anesthesia Type:  General    Note:  Disposition: Outpatient   Postop Pain Control: Uneventful            Sign Out: Well controlled pain   PONV: No   Neuro/Psych: Uneventful            Sign Out: Acceptable/Baseline neuro status   Airway/Respiratory: Uneventful            Sign Out: Acceptable/Baseline resp. status   CV/Hemodynamics: Uneventful            Sign Out: Acceptable CV status; No obvious hypovolemia; No obvious fluid overload   Other NRE: NONE   DID A NON-ROUTINE EVENT OCCUR? No           Last vitals:  Vitals:    05/12/21 1041 05/12/21 1045 05/12/21 1056   BP:  110/76 124/86   Pulse: 75 74 75   Resp: 15 14 17   Temp:  36.2  C (97.2  F)    SpO2: 100% 95% 98%       Last vitals prior to Anesthesia Care Transfer:  CRNA VITALS  5/12/2021 0956 - 5/12/2021 1056      5/12/2021             Resp Rate (observed):  (!) 1    Resp Rate (set):  10          Electronically Signed By: Jorje Gracia MD, MD  May 12, 2021  11:02 AM

## 2021-05-12 NOTE — ANESTHESIA CARE TRANSFER NOTE
Patient: Agustina Julian    Procedure(s):  Left tibia and fibula osteotomy    Diagnosis: Closed fracture of shaft of left tibia with malunion, unspecified fracture morphology, subsequent encounter [S82.202P]  Diagnosis Additional Information: No value filed.    Anesthesia Type:   General     Note:    Oropharynx: oropharynx clear of all foreign objects  Level of Consciousness: awake  Oxygen Supplementation: face mask    Independent Airway: airway patency satisfactory and stable  Dentition: dentition unchanged  Vital Signs Stable: post-procedure vital signs reviewed and stable  Report to RN Given: handoff report given  Patient transferred to: PACU  Comments: VSS and WNL, comfortable, no PONV, report to Jason GUEVARA  Handoff Report: Identifed the Patient, Identified the Reponsible Provider, Reviewed the pertinent medical history, Discussed the surgical course, Reviewed Intra-OP anesthesia mangement and issues during anesthesia, Set expectations for post-procedure period and Allowed opportunity for questions and acknowledgement of understanding      Vitals: (Last set prior to Anesthesia Care Transfer)  CRNA VITALS  5/12/2021 0956 - 5/12/2021 1032      5/12/2021             Resp Rate (observed):  (!) 1    Resp Rate (set):  10        Electronically Signed By: JESUS ALBERTO Nuñez CRNA  May 12, 2021  10:32 AM

## 2021-05-12 NOTE — ANESTHESIA PROCEDURE NOTES
Adductor canal Procedure Note  Pre-Procedure   Staff -        Anesthesiologist:  Jorje Gracia MD       Performed By: anesthesiologist       Location: pre-op       Procedure Start/Stop Times: 5/12/2021 8:10 AM and 5/12/2021 8:19 AM       Pre-Anesthestic Checklist: patient identified, IV checked, site marked, risks and benefits discussed, informed consent, monitors and equipment checked, pre-op evaluation, at physician/surgeon's request and post-op pain management  Timeout:       Correct Patient: Yes        Correct Procedure: Yes        Correct Site: Yes        Correct Position: Yes        Correct Laterality: Yes        Site Marked: Yes  Procedure Documentation  Procedure: Adductor canal       Laterality: left       Patient Position: supine       Skin prep: Chloraprep       Needle Type: insulated and short bevel       Needle Gauge: 21.        Needle Length (Inches): 4        - Ultrasound guided       - Ultrasound used to identify targeted nerve, plexus, vascular marker, or fascial plane and place a needle adjacent to it in real-time       - Ultrasound was used to visualize the spread of anesthetic in close proximity to the above referenced structure       - A permanent image is entered into the patient's record.       - The nerve(s) appeared anatomically normal       - There were no apparent abnormal pathologic findings    Assessment/Narrative         The placement was negative for: blood aspirated, painful injection and site bleeding       Paresthesias: No.     Bolus given via needle..        Secured via.        Insertion/Infusion Method: Single Shot    Medication(s) Administered   Bupivacaine 0.25% PF (Infiltration), 10 mL  Bupivacaine liposome (Exparel) 1.3% LA inj susp (Infiltration), 10 mL  Comments:  133 mg exparel given via adductor canal block

## 2021-05-12 NOTE — DISCHARGE INSTRUCTIONS
"Information about liposomal bupivacaine (Exparel)    What is Liposomal Bupivacaine?    Liposomal Bupivacaine is a numbing medication that can help you manage your pain after surgery.  This medication is similar to \"novacaine,\" which is often used by the dentist.  Liposomal bupivacaine is released slowly and can help control pain for up to 72 hours.    What is the purpose of Liposomal Bupivacaine?    To manage your pain after surgery    To help you sleep better, take deep breaths, walk more comfortable, and feel up to visiting with others    How is the procedure done?    Liposomal bupivacaine is a medication given by an injection.    It is usually given right before your surgery.  If this is the case, you will be awake or sedated, but you should experience minimal pain during the procedure.    For some people, the injection may be given at the very end of your surgery.  It all depends on the type of surgery and your situation.    The procedure usually takes about 5-15 minutes.  An ultrasound machine will help the anesthesiologist insert it in the right place or the surgeon will inject it under direct vision.     A needle is used to place the numbing medication under your skin.  It provides pain relief by numbing the tissue in the area where your surgeon will make the incision.    What can I expect?    You may experience numbness, tingling, or a feeling of heaviness around the area that was injected.    If you experience any of the follow symptoms IMMEDIATELY CALL THE REGIONAL ANESTHESIA PAIN SERVICE:    Numbness or tingling occurs in areas other than around the injection site    Blurry vision    Ringing in your ears    A metallic taste in your mouth    PAGE: Dial [phone].  When prompted, enter the following 4-digit ID number:  [id number].  You will be prompted to enter your phone number; and then enter the # sign.  The clinician on call will call you back.    OR    CALL: Dial [phone].  Let the hospital  " know that you are having a problem with a nerve block and that you would like to speak to the regional anesthesia pain service right away.    You should not receive any other type of numbing medication within 4 days after receiving liposomal bupivacaine unless your anesthesiologist approves.    Post Operative Instructions: Regional Anesthetic for Lower Extremity with Liposomal Bupivacaine  General Information:   Regional anesthesia is when local anesthetic or  numbing  medication is injected around the nerves to anesthetize or  numb  the area supplied by that set of nerves. It is a type of analgesia used to control pain and decreases the need for narcotics following surgery.    Types of Regional Blocks:  Femoral: A block injected into the groin area of the operative leg of a patient having thigh or knee surgery.  Adductor Canal: A block injected into the mid thigh of the operative leg of a patient having knee or ankle surgery.  Popliteal or Distal Sciatic: A block injected into the back of the knee of the operative leg of patients having foot or ankle surgery.   Ankle:  An anesthetic medication is injected into the ankle of the operative leg of a patient having foot or toe surgery.     Procedure:   The type of anesthesia your doctor used to numb your leg will usually not wear off for 24-48 hours, but may last as long as 72 hours. You should be careful during that period, since it is possible to injure your leg without being aware of the injury. While your leg is numb you should:    Use crutches (minimal weight bearing until your motor and strength is completely back to normal)    Avoid striking or bumping your leg    Avoid extreme hot or cold    Discomfort:  You will have a tingling and prickly sensation in your leg as the feeling begins to return; you can also expect some discomfort. The amount of discomfort is unpredictable, but if you have more pain than can be controlled with pain medication, you should notify  "your physician.     Pain Medicine:   Begin taking your oral pain pills before bedtime and during the night to avoid a sudden onset of pain as part of the block wears off. Do not engage in drinking, driving, or hazardous occupations while taking pain medication.     Safety Tips for Using Crutches    Crutch Fit:    Assume good standing posture with shoulders relaxed and crutch tips 6-8 inches out from the side of the foot.    The underarm pad should fall 2-3 fingers width below the armpit.    The handgrip is positioned level with the wrist to allow 30  flexion at the elbow.    Safety Tips:    Bear weight on your hands, not on your armpits.    Do not add extra padding to the underarm pad. This will, in effect, lengthen the crutches and increase risk of nerve injury.    Wear flat, properly fitting shoes. Do not walk in stocking feet, high heels or slippers.    Household hazards:  --Throw rugs should be removed from floors.  --Stairs should be cleared of obstacles.  --Use extra caution on slippery, highly polished, littered or uneven floor surfaces.  --Check for electric cords.    Check crutch tips for excessive wear and keep wing nuts tight.    While walking, look forward with  head up  and  eyes open.  Take equal length steps.    Use BOTH crutches.    Stairs Sequence:    UP: \"Good\" leg first, followed by  bad  leg, then crutches.    DOWN: Crutches, followed by  bad  leg, \"good\" leg.     Walking with Crutches:    Move both crutches forward at the same time.    Non-Weight Bearing (NWB):  Hold the involved leg up and swing through the crutches with the involved leg. The involved leg does not touch the floor.    Toe Touch Weight Bearing (TTWB): Move the involved leg forward. Rest it lightly on the floor for balance only. Step through the crutches with the uninvolved leg.    Partial Weight Bearing (PWB): Move the involved leg forward. Step down the weight of the leg only.  Step through the crutches with the uninvolved " "leg.  Weight Bearing As Tolerated (WBAT): Move the involved leg forward. Put as much pressure through the involved leg as you can tolerate comfortably. Then step through the crutches with the uninvolved leg.[facility]  Home Care Following Anesthesia  For 24 hours after surgery:  1. Get plenty of rest.  A responsible adult must stay with you for at least 24 hours after you leave the surgery center.  2. Do not drive or use heavy equipment.  If you have weakness or tingling, don't drive or use heavy equipment until this feeling goes away.   3. Do not drink alcohol.   4. Avoid strenuous or risky activities.  Ask for help when climbing stairs.  5. You may feel lightheaded.  IF so, sit for a few minutes before standing.  Have someone help you get up.   6. If you have nausea (feel sick to your stomach): Drink only clear liquids such as apple juice, ginger ale, broth or 7-Up.  Rest may also help.  Be sure to drink enough fluids.  Move to a regular diet as you feel able.   7. You may have a slight fever.  Call the doctor if your fever is over 100 F (37.7 C) (taken under the tongue) or lasts longer than 24 hours.  8. You may have a dry mouth, a sore throat, muscle aches or trouble sleeping. These should go away after 24 hours.  9. Do not make important or legal decisions.   10. It is recommended to avoid smoking.   If you use hormonal birth control (such as the pill, patch, ring or implants):  You will need a second form of birth control for 7 days (condoms, a diaphragm or contraceptive foam).  While in the surgery center, you received a medicine called Sugammadex.  Hormonal birth control (such as the pill, patch, ring or implants) will not work as well for a week after taking this medicine.  Today you received a Marcaine or bupivacaine block to numb the nerves near your surgery site.  This is a block using local anesthetic or \"numbing\" medication injected around the nerves to anesthetize " "or \"numb\" the area supplied by those nerves.  This block is injected into the muscle layer near your surgical site.  The medication may numb the location where you had surgery for 6-18 hours, but may last up to 24 hours.  If your surgical site is an arm or leg you should be careful with your affected limb, since it is possible to injure your limb without being aware of it due to the numbing.  Until full feeling returns, you should guard against bumping or hitting your limb, and avoid extreme hot or cold temperatures on the skin.  As the block wears off, the feeling will return as a tingling or prickly sensation near your surgical site.  You will experience more discomfort from your incision as the feeling returns.  You may want to take a pain pill (a narcotic or Tylenol if this was prescribed by your surgeon) when you start to experience mild pain before the pain beccomes more severe.  If your pain medications do not control your pain you should notifiy your surgeon.    Tips for taking pain medications  To get the best pain relief possible, remember these points:    Take pain medications as directed, before pain becomes severe.    Pain medication can upset your stomach: taking it with food may help.    Constipation is a common side effect of pain medication. Drink plenty of  fluids.    Eat foods high in fiber. Take a stool softener if recommended by your doctor or pharmacist.    Do not drink alcohol, drive or operate machinery while taking pain medications.    Ask about other ways to control pain, such as with heat, ice or relaxation.    Tylenol/Acetaminophen Consumption  To help encourage the safe use of acetaminophen, the makers of TYLENOL  have lowered the maximum daily dose for single-ingredient Extra Strength TYLENOL  (acetaminophen) products sold in the [place] from 8 pills per day (4,000 mg) to 6 pills per day (3,000 mg). The dosing interval has also changed from 2 pills every 4-6 hours to 2 pills every 6 " hours.    If you feel your pain relief is insufficient, you may take Tylenol/Acetaminophen in addition to your narcotic pain medication.     Be careful not to exceed 3,000 mg of Tylenol/Acetaminophen in a 24 hour period from all sources.    If you are taking extra strength Tylenol/acetaminophen (500 mg), the maximum dose is 6 tablets in 24 hours.    If you are taking regular strength acetaminophen (325 mg), the maximum dose is 9 tablets in 24 hours.    Call a doctor for any of the followin. Signs of infection (fever, growing tenderness at the surgery site, a large amount of drainage or bleeding, severe pain, foul-smelling drainage, redness, swelling).  2. It has been over 8 to 10 hours since surgery and you are still not able to urinate (pass water).  3. Headache for over 24 hours.  4. Numbness, tingling or weakness the day after surgery (if you had spinal anesthesia).  5. Signs of Covid-19 infection (temperature over 100 degrees, shortness of breath, cough, loss of taste/smell, generalized body aches, persistent headache, chills, sore throat, nausea/vomiting/diarrhea)  Your doctor is:       Dr. Stiven Santiago, Orthopaedics: [phone]               Or dial [phone] and ask for the resident on call for:  Orthopaedics  For emergency care, call the:  [facility] Emergency Department: [phone] (TTY for hearing impaired: [phone])

## 2021-05-17 NOTE — OP NOTE
Procedure Date: 05/12/2021    PREOPERATIVE DIAGNOSIS:  Left tibia malunion.    POSTOPERATIVE DIAGNOSIS:  Left tibia malunion.    PROCEDURE:  Left tibia and fibular osteotomy.    SURGEON:  Stiven Santiago MD    ASSISTANT:  Anna Harris PA-C   Ms. Steven's assistance was required in order to provide assistance with positioning, the surgery itself, holding retractors, closure of the wound and application of immobilization devices.  At the time of the surgery, there was no available help from an orthopedic trainee.  COMPLICATIONS:  None.    SPECIMENS:  None.    ESTIMATED BLOOD LOSS:  Less than 20 mL    ANESTHESIA:  General endotracheal.    INDICATIONS:  Please refer to the clinic note for further details and discussion of indications of Mrs. Julian case.    DESCRIPTION OF PROCEDURE:  On 05/12/2021, patient was taken to surgery.  Preoperative antibiotics were administered to the patient prior to arrival to the OR.    After successful induction of general endotracheal anesthesia, she was placed supine on the operating table.  The left lower extremity was prepped and draped in sterile fashion.  After exsanguination by gravity, tourniquet cuff was inflated to 300 mmHg in the proximal third of the left thigh.  The pause for the cause was performed according to institutional policy which confirmed laterality of the procedure.    An incision was made along the anterior lateral portion of the malunion.  Subcutaneous were dissected.  The tibia was protected and eventually we proceeded with exposure of the malunion site.    Through an independent incision, we proceeded also with exposure of the fibula along the lateral aspect of the leg.  We proceeded with an oblique osteotomy with an oscillating saw.    Along the tibial side we proceeded with placement of 2 K-wires in oblique fashion with approximately 15 mm in between the 2 K-wires in order to provide a correction of approximately 12 degrees of valgus  producing.    Eventually, the wedge was removed.  We proceeded with the closure of the osteotomy and placement of a large fragment plate with purchase of 6 cortices proximal and distal to the osteotomy site.    We confirmed the fluoroscopic examination in AP and lateral projections of the tibia to have excellent alignment of the tibia as well as location of the hardware.  These images were sent to PACS for definitive documentation.    Tourniquet was deflated.  Satisfactory hemostasis was accomplished.  Wound was closed in layers.  A sterile dressing was applied.  The patient was placed in a plaster splint and transferred in stable condition to PACU.    PLAN:  The patient will remain nonweightbearing x3 months.  She will be in clinic in 2 weeks for a wound check. At that time, sutures will be removed if indicated.  She will be placed in a short leg cast in a neutral alignment.  The patient then will return to clinic at 6 weeks from surgery, and at that time, two views of the left tibia will be obtained, and based on those findings, further recommendations will be given to the patient.    Stiven Santiago MD        D: 2021   T: 2021   MT: [id number]    Name:     CORINA ANTONIO  MRN:      [id number]        Account:        [id number]   :      1980           Procedure Date: 2021     Document: [id number]

## 2021-05-18 DIAGNOSIS — M25.572 ACUTE LEFT ANKLE PAIN: ICD-10-CM

## 2021-05-18 RX ORDER — OXYCODONE HYDROCHLORIDE 5 MG/1
2.5-5 TABLET ORAL EVERY 4 HOURS PRN
Qty: 18 TABLET | Refills: 0 | Status: SHIPPED | OUTPATIENT
Start: 2021-05-18 | End: 2021-12-08

## 2021-05-18 NOTE — TELEPHONE ENCOUNTER
Agustina underwent left tibia osteotomy with Dr Santiago on 5/12/21.  She received #26 oxycodone at that time, and took the last pill yesterday.  She also takes neurontin.  Pt states she is feeling well, resting with leg elevated.  Pt was instructed in tapering off her oxycodone and pt agrees to the plan.  We will refill with tapering instructions.  Pt has appt next week for suture removal.  Enid Mcneal RN

## 2021-05-24 NOTE — PROGRESS NOTES
Reason for visit:    Agustina Julian came in to the clinic for a two week post op check.    Her surgery was done 5/12/21 by Dr Santiago.  She had left tibia and fibular osteotomy     Assessment:    Agustina came into the clinic in post op splint Non-WB    The Surgical wounds were exposed and found to be well-healed and without evidence of infection; so the sutures were removed. CMS was found to be intact.  Plan:     She was placed in Short leg cast.  She was told to remain Non-WB     She has an appointment to see Dr. Santiago at that time Dr. Santiago will determine further restrictions.    She has our phone number and will call with questions or problems.    Emely Lama ATC      Cast/splint application    Date/Time: 5/26/2021 8:59 AM  Performed by: Emely Lama ATC  Authorized by: Stiven Santiago MD     Consent:     Consent obtained:  Verbal    Consent given by:  Patient  Pre-procedure details:     Sensation:  Normal  Procedure details:     Laterality:  Left    Location:  Leg    Leg:  L lower leg    Cast type:  Short leg    Supplies:  Fiberglass  Post-procedure details:     Sensation:  Normal    Patient tolerance of procedure:  Tolerated well, no immediate complications    Patient provided with cast or splint care instructions: Yes

## 2021-05-26 ENCOUNTER — OFFICE VISIT (OUTPATIENT)
Dept: ORTHOPEDICS | Facility: CLINIC | Age: 41
End: 2021-05-26
Payer: COMMERCIAL

## 2021-05-26 DIAGNOSIS — M79.605 PAIN OF LEFT LOWER EXTREMITY: Primary | ICD-10-CM

## 2021-05-26 PROCEDURE — 29405 APPL SHORT LEG CAST: CPT | Mod: 58

## 2021-05-26 PROCEDURE — 99024 POSTOP FOLLOW-UP VISIT: CPT

## 2021-06-11 ENCOUNTER — OFFICE VISIT (OUTPATIENT)
Dept: ORTHOPEDICS | Facility: CLINIC | Age: 41
End: 2021-06-11
Payer: COMMERCIAL

## 2021-06-11 ENCOUNTER — ANCILLARY PROCEDURE (OUTPATIENT)
Dept: GENERAL RADIOLOGY | Facility: CLINIC | Age: 41
End: 2021-06-11
Attending: PODIATRIST
Payer: COMMERCIAL

## 2021-06-11 VITALS
HEART RATE: 99 BPM | DIASTOLIC BLOOD PRESSURE: 81 MMHG | SYSTOLIC BLOOD PRESSURE: 113 MMHG | OXYGEN SATURATION: 99 % | TEMPERATURE: 97.8 F

## 2021-06-11 DIAGNOSIS — M79.605 PAIN OF LEFT LOWER EXTREMITY: ICD-10-CM

## 2021-06-11 DIAGNOSIS — S82.202P CLOSED FRACTURE OF SHAFT OF LEFT TIBIA WITH MALUNION, UNSPECIFIED FRACTURE MORPHOLOGY, SUBSEQUENT ENCOUNTER: ICD-10-CM

## 2021-06-11 DIAGNOSIS — M79.605 PAIN OF LEFT LOWER EXTREMITY: Primary | ICD-10-CM

## 2021-06-11 LAB
CRP SERPL-MCNC: 23.3 MG/L (ref 0–8)
ERYTHROCYTE [DISTWIDTH] IN BLOOD BY AUTOMATED COUNT: 14.8 % (ref 10–15)
ERYTHROCYTE [SEDIMENTATION RATE] IN BLOOD BY WESTERGREN METHOD: 74 MM/H (ref 0–20)
HCT VFR BLD AUTO: 36.3 % (ref 35–47)
HGB BLD-MCNC: 12.1 G/DL (ref 11.7–15.7)
MCH RBC QN AUTO: 25.7 PG (ref 26.5–33)
MCHC RBC AUTO-ENTMCNC: 33.3 G/DL (ref 31.5–36.5)
MCV RBC AUTO: 77 FL (ref 78–100)
PLATELET # BLD AUTO: 282 10E9/L (ref 150–450)
RBC # BLD AUTO: 4.7 10E12/L (ref 3.8–5.2)
WBC # BLD AUTO: 6.2 10E9/L (ref 4–11)

## 2021-06-11 PROCEDURE — 73590 X-RAY EXAM OF LOWER LEG: CPT | Mod: LT | Performed by: RADIOLOGY

## 2021-06-11 PROCEDURE — 99207 PR NO CHARGE NURSE ONLY: CPT

## 2021-06-11 PROCEDURE — 36415 COLL VENOUS BLD VENIPUNCTURE: CPT | Performed by: PATHOLOGY

## 2021-06-11 PROCEDURE — 86140 C-REACTIVE PROTEIN: CPT | Performed by: PATHOLOGY

## 2021-06-11 PROCEDURE — 99024 POSTOP FOLLOW-UP VISIT: CPT | Performed by: PODIATRIST

## 2021-06-11 PROCEDURE — 85652 RBC SED RATE AUTOMATED: CPT | Performed by: PATHOLOGY

## 2021-06-11 PROCEDURE — 85027 COMPLETE CBC AUTOMATED: CPT | Performed by: PATHOLOGY
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
## 2021-06-11 NOTE — PROGRESS NOTES
This patient came in for a cast change. She was placed in a short leg cast about two 2 weeks ago. She reported feeling wet inside the cast with a smell.       The cast was removed and it was wound that she has had an opened blister.     Consulted with Dr. Vines. He would like to see the patient in his clinic. Patient added to Dr. Vines's clinic. Further care will be made by him.               -JESSE Fischer- Orthopedics
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
## 2021-06-16 ENCOUNTER — OFFICE VISIT (OUTPATIENT)
Dept: ORTHOPEDICS | Facility: CLINIC | Age: 41
End: 2021-06-16
Payer: COMMERCIAL

## 2021-06-16 ENCOUNTER — TELEPHONE (OUTPATIENT)
Dept: ORTHOPEDICS | Facility: CLINIC | Age: 41
End: 2021-06-16

## 2021-06-16 DIAGNOSIS — S82.202P CLOSED FRACTURE OF SHAFT OF LEFT TIBIA WITH MALUNION, UNSPECIFIED FRACTURE MORPHOLOGY, SUBSEQUENT ENCOUNTER: Primary | ICD-10-CM

## 2021-06-16 PROCEDURE — 29515 APPLICATION SHORT LEG SPLINT: CPT | Mod: LT | Performed by: PODIATRIST

## 2021-06-16 NOTE — LETTER
6/16/2021         RE: Agustina Julian  [address]  [place] [address]        Dear Colleague,    Thank you for referring your patient, Agustina Julian, to the [facility] [place]. Please see a copy of my visit note below.    Chief Complaint   Patient presents with     Wound Check     S/p left tibia osteotomy, DOS: 5-. Unna boot change.             HPI: Agustina is a 40 year old female who presents today for further evaluation of left leg ulcer.  She is with her  today.  She relates that she came in a few days early, as she was nervous about the drainage that was coming through the Unna boot.  Otherwise, she is doing quite well.    Review of Systems: No nausea, vomiting, diarrhea, fever, chills, night sweats, shortness of breath, chest pain.    PMH: No past medical history on file.    PSxH:   Past Surgical History:   Procedure Laterality Date     OPEN REDUCTION INTERNAL FIXATION TIBIA Left 2004    [facility]     OSTEOTOMY TIBIA Left 5/12/2021    Procedure: Left tibia and fibula osteotomy;  Surgeon: Stiven Santiago MD;  Location: [facility] [place] [facility]       Allergies: Patient has no known allergies.    SH:   Social History     Socioeconomic History     Marital status:      Spouse name: Not on file     Number of children: Not on file     Years of education: Not on file     Highest education level: Not on file   Occupational History     Not on file   Social Needs     Financial resource strain: Not on file     Food insecurity     Worry: Not on file     Inability: Not on file     Transportation needs     Medical: Not on file     Non-medical: Not on file   Tobacco Use     Smoking status: Never Smoker     Smokeless tobacco: Never Used   Substance and Sexual Activity     Alcohol use: Not Currently     Drug use: Never     Sexual activity: Yes     Partners: Male   Lifestyle     Physical activity     Days per week: Not on file     Minutes per session: Not on  file     Stress: Not on file   Relationships     Social connections     Talks on phone: Not on file     Gets together: Not on file     Attends Taoism service: Not on file     Active member of club or organization: Not on file     Attends meetings of clubs or organizations: Not on file     Relationship status: Not on file     Intimate partner violence     Fear of current or ex partner: Not on file     Emotionally abused: Not on file     Physically abused: Not on file     Forced sexual activity: Not on file   Other Topics Concern     Not on file   Social History Narrative     Not on file       FH: No family history on file.    Objective:  Lab Results   Component Value Date    WBC 6.2 06/11/2021     Lab Results   Component Value Date    RBC 4.70 06/11/2021     Lab Results   Component Value Date    HGB 12.1 06/11/2021     Lab Results   Component Value Date    HCT 36.3 06/11/2021     No components found for: MCT  Lab Results   Component Value Date    MCV 77 06/11/2021     Lab Results   Component Value Date    MCH 25.7 06/11/2021     Lab Results   Component Value Date    MCHC 33.3 06/11/2021     Lab Results   Component Value Date    RDW 14.8 06/11/2021     Lab Results   Component Value Date     06/11/2021         PT and DP pulses are 2/4 bilaterally. CRT is instant.  Positive pedal hair.   Gross sensation is normal bilaterally.   Equinus is noted bilaterally. No pain with active or passive ROM of the ankle, MTJ, 1st ray, or halluces bilaterally,.   Nails normal bilaterally.  3 areas of hyper granular tissue are noted to the left anterior leg.  There is copious amounts of fluid draining from the central most lesion.          Assessment: Agustina is a 40-year-old who is status post ORIF of the left tibia.  Wound has improved.  No signs or symptoms of infection.    Plan:  - Pt seen and evaluated.  -A valve, multilayered Unna boot was applied to the left leg.  This should be left clean/dry/intact for 1 week.  -Posterior  splint was applied to the left leg.  -Continue nonweightbearing on the foot.  -See again in 1 week.          Zay Vines DPM

## 2021-06-16 NOTE — NURSING NOTE
Reason For Visit:   Chief Complaint   Patient presents with     Wound Check     S/p left tibia osteotomy, DOS: 5-. Unna boot change.        Pain Assessment  Patient Currently in Pain: Denies        No Known Allergies        Kriss Julian LPN

## 2021-06-16 NOTE — PROGRESS NOTES
Chief Complaint   Patient presents with     Wound Check     S/p left tibia osteotomy, DOS: 5-. Unna boot change.             HPI: Agustina is a 40 year old female who presents today for further evaluation of left leg ulcer.  She is with her  today.  She relates that she came in a few days early, as she was nervous about the drainage that was coming through the Unna boot.  Otherwise, she is doing quite well.    Review of Systems: No nausea, vomiting, diarrhea, fever, chills, night sweats, shortness of breath, chest pain.    PMH: No past medical history on file.    PSxH:   Past Surgical History:   Procedure Laterality Date     OPEN REDUCTION INTERNAL FIXATION TIBIA Left 2004    [facility]     OSTEOTOMY TIBIA Left 5/12/2021    Procedure: Left tibia and fibula osteotomy;  Surgeon: Stiven Santiago MD;  Location: [facility] OR       Allergies: Patient has no known allergies.    SH:   Social History     Socioeconomic History     Marital status:      Spouse name: Not on file     Number of children: Not on file     Years of education: Not on file     Highest education level: Not on file   Occupational History     Not on file   Social Needs     Financial resource strain: Not on file     Food insecurity     Worry: Not on file     Inability: Not on file     Transportation needs     Medical: Not on file     Non-medical: Not on file   Tobacco Use     Smoking status: Never Smoker     Smokeless tobacco: Never Used   Substance and Sexual Activity     Alcohol use: Not Currently     Drug use: Never     Sexual activity: Yes     Partners: Male   Lifestyle     Physical activity     Days per week: Not on file     Minutes per session: Not on file     Stress: Not on file   Relationships     Social connections     Talks on phone: Not on file     Gets together: Not on file     Attends Adventist service: Not on file     Active member of club or organization: Not on file     Attends meetings of clubs or  organizations: Not on file     Relationship status: Not on file     Intimate partner violence     Fear of current or ex partner: Not on file     Emotionally abused: Not on file     Physically abused: Not on file     Forced sexual activity: Not on file   Other Topics Concern     Not on file   Social History Narrative     Not on file       FH: No family history on file.    Objective:  Lab Results   Component Value Date    WBC 6.2 06/11/2021     Lab Results   Component Value Date    RBC 4.70 06/11/2021     Lab Results   Component Value Date    HGB 12.1 06/11/2021     Lab Results   Component Value Date    HCT 36.3 06/11/2021     No components found for: MCT  Lab Results   Component Value Date    MCV 77 06/11/2021     Lab Results   Component Value Date    MCH 25.7 06/11/2021     Lab Results   Component Value Date    MCHC 33.3 06/11/2021     Lab Results   Component Value Date    RDW 14.8 06/11/2021     Lab Results   Component Value Date     06/11/2021         PT and DP pulses are 2/4 bilaterally. CRT is instant.  Positive pedal hair.   Gross sensation is normal bilaterally.   Equinus is noted bilaterally. No pain with active or passive ROM of the ankle, MTJ, 1st ray, or halluces bilaterally,.   Nails normal bilaterally.  3 areas of hyper granular tissue are noted to the left anterior leg.  There is copious amounts of fluid draining from the central most lesion.          Assessment: Agustina is a 40-year-old who is status post ORIF of the left tibia.  Wound has improved.  No signs or symptoms of infection.    Plan:  - Pt seen and evaluated.  -A valve, multilayered Unna boot was applied to the left leg.  This should be left clean/dry/intact for 1 week.  -Posterior splint was applied to the left leg.  -Continue nonweightbearing on the foot.  -See again in 1 week.

## 2021-06-16 NOTE — TELEPHONE ENCOUNTER
RN called and spoke with patient.  Prior to calling, RN consulted with Emely and per Emely, this patient needs to be seen today and also for unna boot change. RN then reached out to Silke for advise. Silke stated to reach out to Dr. Vines. Dr. Vines greenlighted this appt for 1.20pm.  RN relayed that message to patient and she expressed understanding and will come to the clinic at 120pm.    Shashank Cedeño RN        .[facility]    Phone Message    May a detailed message be left on voicemail: yes     Reason for Call: Other: Pt is scheduled for wound check on 6/18 but would like to be seen earlier because there is discharge in the wound. Please call back to coordinate.     Action Taken: Message routed to:  [facility] ([facility] ortho    Travel Screening: Not Applicable

## 2021-06-23 ENCOUNTER — OFFICE VISIT (OUTPATIENT)
Dept: ORTHOPEDICS | Facility: CLINIC | Age: 41
End: 2021-06-23
Payer: COMMERCIAL

## 2021-06-23 DIAGNOSIS — S81.802D WOUND OF LEFT LOWER EXTREMITY, SUBSEQUENT ENCOUNTER: ICD-10-CM

## 2021-06-23 DIAGNOSIS — S82.202P CLOSED FRACTURE OF SHAFT OF LEFT TIBIA WITH MALUNION, UNSPECIFIED FRACTURE MORPHOLOGY, SUBSEQUENT ENCOUNTER: Primary | ICD-10-CM

## 2021-06-23 PROCEDURE — 99024 POSTOP FOLLOW-UP VISIT: CPT | Performed by: PODIATRIST

## 2021-06-23 NOTE — NURSING NOTE
Reason For Visit:   Chief Complaint   Patient presents with     Follow Up     Left lower extremity unna boot change.       Pain Assessment  Patient Currently in Pain: Denies        No Known Allergies        Kriss Julian LPN

## 2021-06-23 NOTE — LETTER
6/23/2021         RE: Agustina Julian  [address]  [place] [address]        Dear Colleague,    Thank you for referring your patient, Agustina Julian, to the [facility] [place]. Please see a copy of my visit note below.    Chief Complaint   Patient presents with     Follow Up     Left lower extremity unna boot change.            HPI: Agustina is a 40 year old female who presents today for further evaluation of left leg ulcer.  She is with her  today.  She relates that she tolerated the boot well with no complications.     Review of Systems: No nausea, vomiting, diarrhea, fever, chills, night sweats, shortness of breath, chest pain.    PMH: No past medical history on file.    PSxH:   Past Surgical History:   Procedure Laterality Date     OPEN REDUCTION INTERNAL FIXATION TIBIA Left 2004    [facility]     OSTEOTOMY TIBIA Left 5/12/2021    Procedure: Left tibia and fibula osteotomy;  Surgeon: Stiven Santiago MD;  Location: [facility] OR       Allergies: Patient has no known allergies.    SH:   Social History     Socioeconomic History     Marital status:      Spouse name: Not on file     Number of children: Not on file     Years of education: Not on file     Highest education level: Not on file   Occupational History     Not on file   Social Needs     Financial resource strain: Not on file     Food insecurity     Worry: Not on file     Inability: Not on file     Transportation needs     Medical: Not on file     Non-medical: Not on file   Tobacco Use     Smoking status: Never Smoker     Smokeless tobacco: Never Used   Substance and Sexual Activity     Alcohol use: Not Currently     Drug use: Never     Sexual activity: Yes     Partners: Male   Lifestyle     Physical activity     Days per week: Not on file     Minutes per session: Not on file     Stress: Not on file   Relationships     Social connections     Talks on phone: Not on file     Gets together:  Not on file     Attends Caodaism service: Not on file     Active member of club or organization: Not on file     Attends meetings of clubs or organizations: Not on file     Relationship status: Not on file     Intimate partner violence     Fear of current or ex partner: Not on file     Emotionally abused: Not on file     Physically abused: Not on file     Forced sexual activity: Not on file   Other Topics Concern     Not on file   Social History Narrative     Not on file       FH: No family history on file.    Objective:  Lab Results   Component Value Date    WBC 6.2 06/11/2021     Lab Results   Component Value Date    RBC 4.70 06/11/2021     Lab Results   Component Value Date    HGB 12.1 06/11/2021     Lab Results   Component Value Date    HCT 36.3 06/11/2021     No components found for: MCT  Lab Results   Component Value Date    MCV 77 06/11/2021     Lab Results   Component Value Date    MCH 25.7 06/11/2021     Lab Results   Component Value Date    MCHC 33.3 06/11/2021     Lab Results   Component Value Date    RDW 14.8 06/11/2021     Lab Results   Component Value Date     06/11/2021         PT and DP pulses are 2/4 bilaterally. CRT is instant.  Positive pedal hair.   Gross sensation is normal bilaterally.   Equinus is noted bilaterally. No pain with active or passive ROM of the ankle, MTJ, 1st ray, or halluces bilaterally,.   Nails normal bilaterally.  1 area of hyper granular tissue are noted to the left anterior leg.  The other lesions have healed over.               Assessment: Agustina is a 40-year-old who is status post ORIF of the left tibia.  Wound has improved.  No signs or symptoms of infection.    Plan:  - Pt seen and evaluated.  -A valve, multilayered Unna boot was applied to the left leg.  This should be left clean/dry/intact for 1 week. Endoform was applied to the left wound.   -Posterior splint was applied to the left leg.  -Continue nonweightbearing on the foot.  -See again in 1 week.         Zay Vines, LDM

## 2021-06-24 NOTE — PROGRESS NOTES
Chief Complaint   Patient presents with     Follow Up     Left lower extremity unna boot change.            HPI: Agustina is a 40 year old female who presents today for further evaluation of left leg ulcer.  She is with her  today.  She relates that she tolerated the boot well with no complications.     Review of Systems: No nausea, vomiting, diarrhea, fever, chills, night sweats, shortness of breath, chest pain.    PMH: No past medical history on file.    PSxH:   Past Surgical History:   Procedure Laterality Date     OPEN REDUCTION INTERNAL FIXATION TIBIA Left 2004    [facility]     OSTEOTOMY TIBIA Left 5/12/2021    Procedure: Left tibia and fibula osteotomy;  Surgeon: Stiven Santiago MD;  Location: [facility] OR       Allergies: Patient has no known allergies.    SH:   Social History     Socioeconomic History     Marital status:      Spouse name: Not on file     Number of children: Not on file     Years of education: Not on file     Highest education level: Not on file   Occupational History     Not on file   Social Needs     Financial resource strain: Not on file     Food insecurity     Worry: Not on file     Inability: Not on file     Transportation needs     Medical: Not on file     Non-medical: Not on file   Tobacco Use     Smoking status: Never Smoker     Smokeless tobacco: Never Used   Substance and Sexual Activity     Alcohol use: Not Currently     Drug use: Never     Sexual activity: Yes     Partners: Male   Lifestyle     Physical activity     Days per week: Not on file     Minutes per session: Not on file     Stress: Not on file   Relationships     Social connections     Talks on phone: Not on file     Gets together: Not on file     Attends Pentecostal service: Not on file     Active member of club or organization: Not on file     Attends meetings of clubs or organizations: Not on file     Relationship status: Not on file     Intimate partner violence     Fear of current or  ex partner: Not on file     Emotionally abused: Not on file     Physically abused: Not on file     Forced sexual activity: Not on file   Other Topics Concern     Not on file   Social History Narrative     Not on file       FH: No family history on file.    Objective:  Lab Results   Component Value Date    WBC 6.2 06/11/2021     Lab Results   Component Value Date    RBC 4.70 06/11/2021     Lab Results   Component Value Date    HGB 12.1 06/11/2021     Lab Results   Component Value Date    HCT 36.3 06/11/2021     No components found for: MCT  Lab Results   Component Value Date    MCV 77 06/11/2021     Lab Results   Component Value Date    MCH 25.7 06/11/2021     Lab Results   Component Value Date    MCHC 33.3 06/11/2021     Lab Results   Component Value Date    RDW 14.8 06/11/2021     Lab Results   Component Value Date     06/11/2021         PT and DP pulses are 2/4 bilaterally. CRT is instant.  Positive pedal hair.   Gross sensation is normal bilaterally.   Equinus is noted bilaterally. No pain with active or passive ROM of the ankle, MTJ, 1st ray, or halluces bilaterally,.   Nails normal bilaterally.  1 area of hyper granular tissue are noted to the left anterior leg.  The other lesions have healed over.               Assessment: Agustina is a 40-year-old who is status post ORIF of the left tibia.  Wound has improved.  No signs or symptoms of infection.    Plan:  - Pt seen and evaluated.  -A valve, multilayered Unna boot was applied to the left leg.  This should be left clean/dry/intact for 1 week. Endoform was applied to the left wound.   -Posterior splint was applied to the left leg.  -Continue nonweightbearing on the foot.  -See again in 1 week.

## 2021-06-29 ENCOUNTER — OFFICE VISIT (OUTPATIENT)
Dept: ORTHOPEDICS | Facility: CLINIC | Age: 41
End: 2021-06-29
Payer: COMMERCIAL

## 2021-06-29 ENCOUNTER — ANCILLARY PROCEDURE (OUTPATIENT)
Dept: GENERAL RADIOLOGY | Facility: CLINIC | Age: 41
End: 2021-06-29
Attending: ORTHOPAEDIC SURGERY
Payer: COMMERCIAL

## 2021-06-29 DIAGNOSIS — S82.202P CLOSED FRACTURE OF SHAFT OF LEFT TIBIA WITH MALUNION, UNSPECIFIED FRACTURE MORPHOLOGY, SUBSEQUENT ENCOUNTER: ICD-10-CM

## 2021-06-29 DIAGNOSIS — M25.572 PAIN IN JOINT, ANKLE AND FOOT, LEFT: Primary | ICD-10-CM

## 2021-06-29 PROCEDURE — 73590 X-RAY EXAM OF LOWER LEG: CPT | Mod: LT | Performed by: RADIOLOGY

## 2021-06-29 PROCEDURE — 99024 POSTOP FOLLOW-UP VISIT: CPT | Performed by: ORTHOPAEDIC SURGERY

## 2021-06-29 NOTE — NURSING NOTE
Reason For Visit:   Chief Complaint   Patient presents with     RECHECK     Left tib/fib osteotomy DOS 5/12/21       There were no vitals taken for this visit.    Pain Assessment  Patient Currently in Pain: Yes  0-10 Pain Scale: 2  Primary Pain Location: Leg(Left)    Bhakti Martinez ATC

## 2021-06-29 NOTE — PROGRESS NOTES
CHIEF COMPLAINT:  Status post left tibia and fibula osteotomy performed on 05/12/2021.    HISTORY OF PRESENT ILLNESS:  Ms. Julian presents today for further followup in the company of her .  Denies to have any problems or difficulties.  Reports to be compliant.    The patient reports to have developed some blistering.  So far she has been treated with Unna boots and a posterior splint.  Reports to be compliant with the nonweightbearing portion.    PHYSICAL EXAMINATION:  On today's visit, she presents with a very small skin opening along the most anterior portion of the leg which surprisingly enough is not where the surgical incision was placed.  When compared to previous photos that her  showed me today on his phone, they clearly show she is making excellent progress with it.    The surgical incision is completely healed.  CMS is intact.  Alignment is excellent.    IMAGING:  Two views of the tib-fib were obtained today which were significant for showing still some radiolucency across the osteotomy site but overall, she presents with excellent alignment of the tibia, both on the AP and lateral projections.  There is a clear difference between the pre and postoperative states.    ASSESSMENT:  Status post left tibia and fibula osteotomy.    PLAN:  Discussed with the patient and her  to continue the same regimen until the skin is completely healed.  Once the skin is completely healed, she should be placed in a short leg cast with special padding to avoid any further pressure points.    The patient will be reevaluated in 6 weeks from today, and at that time, AP and lateral x-rays of the tib-fib will be obtained.  In the meantime, she will remain nonweightbearing.    All questions were answered.  Patient was pleased with the discussion.

## 2021-06-29 NOTE — LETTER
6/29/2021         RE: Agustina Julian  [address]  [place] [address]        Dear Colleague,    Thank you for referring your patient, Agustina Julian, to the [facility] [place]. Please see a copy of my visit note below.    CHIEF COMPLAINT:  Status post left tibia and fibula osteotomy performed on 05/12/2021.    HISTORY OF PRESENT ILLNESS:  Ms. Julian presents today for further followup in the company of her .  Denies to have any problems or difficulties.  Reports to be compliant.    The patient reports to have developed some blistering.  So far she has been treated with Unna boots and a posterior splint.  Reports to be compliant with the nonweightbearing portion.    PHYSICAL EXAMINATION:  On today's visit, she presents with a very small skin opening along the most anterior portion of the leg which surprisingly enough is not where the surgical incision was placed.  When compared to previous photos that her  showed me today on his phone, they clearly show she is making excellent progress with it.    The surgical incision is completely healed.  CMS is intact.  Alignment is excellent.    IMAGING:  Two views of the tib-fib were obtained today which were significant for showing still some radiolucency across the osteotomy site but overall, she presents with excellent alignment of the tibia, both on the AP and lateral projections.  There is a clear difference between the pre and postoperative states.    ASSESSMENT:  Status post left tibia and fibula osteotomy.    PLAN:  Discussed with the patient and her  to continue the same regimen until the skin is completely healed.  Once the skin is completely healed, she should be placed in a short leg cast with special padding to avoid any further pressure points.    The patient will be reevaluated in 6 weeks from today, and at that time, AP and lateral x-rays of the tib-fib will be obtained.  In the meantime, she will  remain nonweightbearing.    All questions were answered.  Patient was pleased with the discussion.        Stiven Santiago MD

## 2021-07-06 ENCOUNTER — OFFICE VISIT (OUTPATIENT)
Dept: ORTHOPEDICS | Facility: CLINIC | Age: 41
End: 2021-07-06
Payer: COMMERCIAL

## 2021-07-06 DIAGNOSIS — M25.572 PAIN IN JOINT, ANKLE AND FOOT, LEFT: Primary | ICD-10-CM

## 2021-07-06 DIAGNOSIS — S81.802D WOUND OF LEFT LOWER EXTREMITY, SUBSEQUENT ENCOUNTER: ICD-10-CM

## 2021-07-06 PROCEDURE — 29580 STRAPPING UNNA BOOT: CPT | Mod: LT | Performed by: PODIATRIST

## 2021-07-06 NOTE — NURSING NOTE
Reason For Visit:   Chief Complaint   Patient presents with     Wound Check     Left lower extremity unna boot change.        Pain Assessment  Patient Currently in Pain: Denies        No Known Allergies        Kriss Julian LPN

## 2021-07-06 NOTE — LETTER
Verification of Appointment  2021     Seen today: yes    Patient:  Agustina Julian  :   1980  MRN:     [id number]  Physician: ZAY COOK    Agustina Julian may not return to work until clinic re-evaluation on 8-.        Electronically signed by Zay Cook DPM

## 2021-07-06 NOTE — LETTER
7/6/2021         RE: Agustina Julian  [address]  [place] [address]        Dear Colleague,    Thank you for referring your patient, Agustina Julian, to the [facility] [place]. Please see a copy of my visit note below.    Chief Complaint   Patient presents with     Wound Check     Left lower extremity unna boot change.             HPI: Agustina is a 40 year old female who presents today for further evaluation of left leg ulcer.  She is with her  today.  She relates that she tolerated the boot well with no complications.     Review of Systems: No nausea, vomiting, diarrhea, fever, chills, night sweats, shortness of breath, chest pain.    PMH: No past medical history on file.    PSxH:   Past Surgical History:   Procedure Laterality Date     OPEN REDUCTION INTERNAL FIXATION TIBIA Left 2004    [facility]     OSTEOTOMY TIBIA Left 5/12/2021    Procedure: Left tibia and fibula osteotomy;  Surgeon: Stiven Santiago MD;  [place] [facility] [place]       Allergies: Patient has no known allergies.    SH:   Social History     Socioeconomic History     Marital status:      Spouse name: Not on file     Number of children: Not on file     Years of education: Not on file     Highest education level: Not on file   Occupational History     Not on file   Social Needs     Financial resource strain: Not on file     Food insecurity     Worry: Not on file     Inability: Not on file     Transportation needs     Medical: Not on file     Non-medical: Not on file   Tobacco Use     Smoking status: Never Smoker     Smokeless tobacco: Never Used   Substance and Sexual Activity     Alcohol use: Not Currently     Drug use: Never     Sexual activity: Yes     Partners: Male   Lifestyle     Physical activity     Days per week: Not on file     Minutes per session: Not on file     Stress: Not on file   Relationships     Social connections     Talks on phone: Not on file     Gets  together: Not on file     Attends [employer] service: Not on file     Active member of club or organization: Not on file     Attends meetings of clubs or organizations: Not on file     Relationship status: Not on file     Intimate partner violence     Fear of current or ex partner: Not on file     Emotionally abused: Not on file     Physically abused: Not on file     Forced sexual activity: Not on file   Other Topics Concern     Not on file   Social History Narrative     Not on file       FH: No family history on file.    Objective:  Lab Results   Component Value Date    WBC 6.2 06/11/2021     Lab Results   Component Value Date    RBC 4.70 06/11/2021     Lab Results   Component Value Date    HGB 12.1 06/11/2021     Lab Results   Component Value Date    HCT 36.3 06/11/2021     No components found for: MCT  Lab Results   Component Value Date    MCV 77 06/11/2021     Lab Results   Component Value Date    MCH 25.7 06/11/2021     Lab Results   Component Value Date    MCHC 33.3 06/11/2021     Lab Results   Component Value Date    RDW 14.8 06/11/2021     Lab Results   Component Value Date     06/11/2021         PT and DP pulses are 2/4 bilaterally. CRT is instant.  Positive pedal hair.   Gross sensation is normal bilaterally.   Equinus is noted bilaterally. No pain with active or passive ROM of the ankle, MTJ, 1st ray, or halluces bilaterally,.   Nails normal bilaterally.  1 area of granular tissue are noted to the left anterior leg.  The other lesions have healed over.                   Assessment: Agustina is a 40-year-old who is status post ORIF of the left tibia.  Wound has improved.  No signs or symptoms of infection.    Plan:  - Pt seen and evaluated.  -A valve, multilayered Unna boot was applied to the left leg.  This should be left clean/dry/intact for 1 week. Endoform was applied to the left wound.   -Posterior splint was applied to the left leg.  -Continue nonweightbearing on the foot.  -See again in 1 week.             Zay Vines, LDM

## 2021-07-06 NOTE — PROGRESS NOTES
Chief Complaint   Patient presents with     Wound Check     Left lower extremity unna boot change.             HPI: Agustina is a 40 year old female who presents today for further evaluation of left leg ulcer.  She is with her  today.  She relates that she tolerated the boot well with no complications.     Review of Systems: No nausea, vomiting, diarrhea, fever, chills, night sweats, shortness of breath, chest pain.    PMH: No past medical history on file.    PSxH:   Past Surgical History:   Procedure Laterality Date     OPEN REDUCTION INTERNAL FIXATION TIBIA Left 2004    [facility]     OSTEOTOMY TIBIA Left 5/12/2021    Procedure: Left tibia and fibula osteotomy;  Surgeon: Stiven Santiago MD;  Location: [facility] OR       Allergies: Patient has no known allergies.    SH:   Social History     Socioeconomic History     Marital status:      Spouse name: Not on file     Number of children: Not on file     Years of education: Not on file     Highest education level: Not on file   Occupational History     Not on file   Social Needs     Financial resource strain: Not on file     Food insecurity     Worry: Not on file     Inability: Not on file     Transportation needs     Medical: Not on file     Non-medical: Not on file   Tobacco Use     Smoking status: Never Smoker     Smokeless tobacco: Never Used   Substance and Sexual Activity     Alcohol use: Not Currently     Drug use: Never     Sexual activity: Yes     Partners: Male   Lifestyle     Physical activity     Days per week: Not on file     Minutes per session: Not on file     Stress: Not on file   Relationships     Social connections     Talks on phone: Not on file     Gets together: Not on file     Attends Latter day service: Not on file     Active member of club or organization: Not on file     Attends meetings of clubs or organizations: Not on file     Relationship status: Not on file     Intimate partner violence     Fear of current  or ex partner: Not on file     Emotionally abused: Not on file     Physically abused: Not on file     Forced sexual activity: Not on file   Other Topics Concern     Not on file   Social History Narrative     Not on file       FH: No family history on file.    Objective:  Lab Results   Component Value Date    WBC 6.2 06/11/2021     Lab Results   Component Value Date    RBC 4.70 06/11/2021     Lab Results   Component Value Date    HGB 12.1 06/11/2021     Lab Results   Component Value Date    HCT 36.3 06/11/2021     No components found for: MCT  Lab Results   Component Value Date    MCV 77 06/11/2021     Lab Results   Component Value Date    MCH 25.7 06/11/2021     Lab Results   Component Value Date    MCHC 33.3 06/11/2021     Lab Results   Component Value Date    RDW 14.8 06/11/2021     Lab Results   Component Value Date     06/11/2021         PT and DP pulses are 2/4 bilaterally. CRT is instant.  Positive pedal hair.   Gross sensation is normal bilaterally.   Equinus is noted bilaterally. No pain with active or passive ROM of the ankle, MTJ, 1st ray, or halluces bilaterally,.   Nails normal bilaterally.  1 area of granular tissue are noted to the left anterior leg.  The other lesions have healed over.                   Assessment: Agustina is a 40-year-old who is status post ORIF of the left tibia.  Wound has improved.  No signs or symptoms of infection.    Plan:  - Pt seen and evaluated.  -A valve, multilayered Unna boot was applied to the left leg.  This should be left clean/dry/intact for 1 week. Endoform was applied to the left wound.   -Posterior splint was applied to the left leg.  -Continue nonweightbearing on the foot.  -See again in 1 week.

## 2021-07-08 DIAGNOSIS — M25.572 PAIN IN JOINT, ANKLE AND FOOT, LEFT: Primary | ICD-10-CM

## 2021-07-12 NOTE — PROGRESS NOTES
Chief Complaint   Patient presents with     Wound Check     Left tibia and fibula osteotomy - Left, DOS: 5-.               HPI: Agustina is a 40 year old female who presents today for further evaluation of left leg ulcer.  She is with her  today.  She relates that she tolerated the boot well with no complications.     Review of Systems: No nausea, vomiting, diarrhea, fever, chills, night sweats, shortness of breath, chest pain.    PMH: No past medical history on file.    PSxH:   Past Surgical History:   Procedure Laterality Date     OPEN REDUCTION INTERNAL FIXATION TIBIA Left 2004    [facility]     OSTEOTOMY TIBIA Left 5/12/2021    Procedure: Left tibia and fibula osteotomy;  Surgeon: Stiven Santiago MD;  Location: [facility] OR       Allergies: Patient has no known allergies.    SH:   Social History     Socioeconomic History     Marital status:      Spouse name: Not on file     Number of children: Not on file     Years of education: Not on file     Highest education level: Not on file   Occupational History     Not on file   Tobacco Use     Smoking status: Never Smoker     Smokeless tobacco: Never Used   Substance and Sexual Activity     Alcohol use: Not Currently     Drug use: Never     Sexual activity: Yes     Partners: Male   Other Topics Concern     Not on file   Social History Narrative     Not on file     Social Determinants of Health     Financial Resource Strain:      Difficulty of Paying Living Expenses:    Food Insecurity:      Worried About Running Out of Food in the Last Year:      Ran Out of Food in the Last Year:    Transportation Needs:      Lack of Transportation (Medical):      Lack of Transportation (Non-Medical):    Physical Activity:      Days of Exercise per Week:      Minutes of Exercise per Session:    Stress:      Feeling of Stress :    Social Connections:      Frequency of Communication with Friends and Family:      Frequency of Social Gatherings with  Friends and Family:      Attends Mu-ism Services:      Active Member of Clubs or Organizations:      Attends Club or Organization Meetings:      Marital Status:    Intimate Partner Violence:      Fear of Current or Ex-Partner:      Emotionally Abused:      Physically Abused:      Sexually Abused:        FH: No family history on file.    Objective:  Lab Results   Component Value Date    WBC 6.2 06/11/2021     Lab Results   Component Value Date    RBC 4.70 06/11/2021     Lab Results   Component Value Date    HGB 12.1 06/11/2021     Lab Results   Component Value Date    HCT 36.3 06/11/2021     No components found for: MCT  Lab Results   Component Value Date    MCV 77 06/11/2021     Lab Results   Component Value Date    MCH 25.7 06/11/2021     Lab Results   Component Value Date    MCHC 33.3 06/11/2021     Lab Results   Component Value Date    RDW 14.8 06/11/2021     Lab Results   Component Value Date     06/11/2021         PT and DP pulses are 2/4 bilaterally. CRT is instant.  Positive pedal hair.   Gross sensation is normal bilaterally.   Equinus is noted bilaterally. No pain with active or passive ROM of the ankle, MTJ, 1st ray, or halluces bilaterally,.   All open lesions have healed over.                 Assessment: Agustina is a 40-year-old who is status post ORIF of the left tibia.  Wounds healed.     Plan:  - Pt seen and evaluated.  - Wounds have healed. Will discharge her from my care and she will cont care with Dr. Santiago.  - Cast was applied to the left leg.   - F/U with Dr. Santiago.       Cast/splint application    Date/Time: 7/13/2021 11:09 AM  Performed by: Emely Lama ATC  Authorized by: Zay Vines DPM     Consent:     Consent obtained:  Verbal    Consent given by:  Patient  Pre-procedure details:     Sensation:  Normal  Procedure details:     Laterality:  Left    Cast type:  Short leg    Supplies:  Fiberglass  Post-procedure details:     Sensation:  Normal    Patient tolerance of  procedure:  Tolerated well, no immediate complications    Patient provided with cast or splint care instructions: Yes

## 2021-07-13 ENCOUNTER — OFFICE VISIT (OUTPATIENT)
Dept: ORTHOPEDICS | Facility: CLINIC | Age: 41
End: 2021-07-13
Payer: COMMERCIAL

## 2021-07-13 DIAGNOSIS — S82.202P CLOSED FRACTURE OF SHAFT OF LEFT TIBIA WITH MALUNION, UNSPECIFIED FRACTURE MORPHOLOGY, SUBSEQUENT ENCOUNTER: Primary | ICD-10-CM

## 2021-07-13 PROCEDURE — 99213 OFFICE O/P EST LOW 20 MIN: CPT | Performed by: PODIATRIST

## 2021-07-13 PROCEDURE — 99207 CAST/SPLINT APPLICATION: CPT

## 2021-07-13 NOTE — NURSING NOTE
Reason For Visit:   Chief Complaint   Patient presents with     Wound Check     Left tibia and fibula osteotomy - Left, DOS: 5-.        Pain Assessment  Patient Currently in Pain: Denies        No Known Allergies        Kriss Julian LPN

## 2021-07-13 NOTE — LETTER
7/13/2021         RE: Agustina Julian  [address]  [place] [address]        Dear Colleague,    Thank you for referring your patient, Agustina Julian, to the [facility] [place]. Please see a copy of my visit note below.    Chief Complaint   Patient presents with     Wound Check     Left tibia and fibula osteotomy - Left, DOS: 5-.               HPI: Agustina is a 40 year old female who presents today for further evaluation of left leg ulcer.  She is with her  today.  She relates that she tolerated the boot well with no complications.     Review of Systems: No nausea, vomiting, diarrhea, fever, chills, night sweats, shortness of breath, chest pain.    PMH: No past medical history on file.    PSxH:   Past Surgical History:   Procedure Laterality Date     OPEN REDUCTION INTERNAL FIXATION TIBIA Left 2004    [facility]     OSTEOTOMY TIBIA Left 5/12/2021    Procedure: Left tibia and fibula osteotomy;  Surgeon: Stiven Santiago MD;  Location: [facility] [place]       Allergies: Patient has no known allergies.    SH:   Social History     Socioeconomic History     Marital status:      Spouse name: Not on file     Number of children: Not on file     Years of education: Not on file     Highest education level: Not on file   Occupational History     Not on file   Tobacco Use     Smoking status: Never Smoker     Smokeless tobacco: Never Used   Substance and Sexual Activity     Alcohol use: Not Currently     Drug use: Never     Sexual activity: Yes     Partners: Male   Other Topics Concern     Not on file   Social History Narrative     Not on file     Social Determinants of Health     Financial Resource Strain:      Difficulty of Paying Living Expenses:    Food Insecurity:      Worried About Running Out of Food in the Last Year:      Ran Out of Food in the Last Year:    Transportation Needs:      Lack of Transportation (Medical):      Lack of Transportation  (Non-Medical):    Physical Activity:      Days of Exercise per Week:      Minutes of Exercise per Session:    Stress:      Feeling of Stress :    Social Connections:      Frequency of Communication with Friends and Family:      Frequency of Social Gatherings with Friends and Family:      Attends Nondenominational Services:      Active Member of Clubs or Organizations:      Attends Club or Organization Meetings:      Marital Status:    Intimate Partner Violence:      Fear of Current or Ex-Partner:      Emotionally Abused:      Physically Abused:      Sexually Abused:        FH: No family history on file.    Objective:  Lab Results   Component Value Date    WBC 6.2 06/11/2021     Lab Results   Component Value Date    RBC 4.70 06/11/2021     Lab Results   Component Value Date    HGB 12.1 06/11/2021     Lab Results   Component Value Date    HCT 36.3 06/11/2021     No components found for: MCT  Lab Results   Component Value Date    MCV 77 06/11/2021     Lab Results   Component Value Date    MCH 25.7 06/11/2021     Lab Results   Component Value Date    MCHC 33.3 06/11/2021     Lab Results   Component Value Date    RDW 14.8 06/11/2021     Lab Results   Component Value Date     06/11/2021         PT and DP pulses are 2/4 bilaterally. CRT is instant.  Positive pedal hair.   Gross sensation is normal bilaterally.   Equinus is noted bilaterally. No pain with active or passive ROM of the ankle, MTJ, 1st ray, or halluces bilaterally,.   All open lesions have healed over.                 Assessment: Agustina is a 40-year-old who is status post ORIF of the left tibia.  Wounds healed.     Plan:  - Pt seen and evaluated.  - Wounds have healed. Will discharge her from my care and she will cont care with Dr. Santiago.  - Cast was applied to the left leg.   - F/U with Dr. Santiago.       Cast/splint application    Date/Time: 7/13/2021 11:09 AM  Performed by: Emely Lama ATC  Authorized by: Zay Vines DPM     Consent:     Consent  obtained:  Verbal    Consent given by:  Patient  Pre-procedure details:     Sensation:  Normal  Procedure details:     Laterality:  Left    Cast type:  Short leg    Supplies:  Fiberglass  Post-procedure details:     Sensation:  Normal    Patient tolerance of procedure:  Tolerated well, no immediate complications    Patient provided with cast or splint care instructions: Yes          Again, thank you for allowing me to participate in the care of your patient.        Sincerely,        Zay Vines DPM

## 2021-07-19 ENCOUNTER — TELEPHONE (OUTPATIENT)
Dept: ORTHOPEDICS | Facility: CLINIC | Age: 41
End: 2021-07-19

## 2021-07-19 NOTE — TELEPHONE ENCOUNTER
[facility] Call Center    Phone Message    May a detailed message be left on voicemail: yes     Reason for Call: Other: Pt's  calling regarding patient is having left leg pain that is not going away, took Tylenol but it is not working. Please call Patient's  Frankie regarding this.     Action Taken: Other: [facility]    Travel Screening: Not Applicable

## 2021-07-19 NOTE — TELEPHONE ENCOUNTER
I called and LVM letting Frankie know that Agustina should go back to elevating toes above the nose. She should use the wedge that she was given after surgery and rest most of the day. She can also ice behind the knee and the toes as well as continue with the Tylenol.    Emely Lama, ATC

## 2021-08-10 ENCOUNTER — TELEPHONE (OUTPATIENT)
Dept: ORTHOPEDICS | Facility: CLINIC | Age: 41
End: 2021-08-10

## 2021-08-10 ENCOUNTER — ANCILLARY PROCEDURE (OUTPATIENT)
Dept: CT IMAGING | Facility: CLINIC | Age: 41
End: 2021-08-10
Attending: ORTHOPAEDIC SURGERY
Payer: COMMERCIAL

## 2021-08-10 ENCOUNTER — OFFICE VISIT (OUTPATIENT)
Dept: ORTHOPEDICS | Facility: CLINIC | Age: 41
End: 2021-08-10
Payer: COMMERCIAL

## 2021-08-10 ENCOUNTER — ANCILLARY PROCEDURE (OUTPATIENT)
Dept: GENERAL RADIOLOGY | Facility: CLINIC | Age: 41
End: 2021-08-10
Attending: ORTHOPAEDIC SURGERY
Payer: COMMERCIAL

## 2021-08-10 DIAGNOSIS — M25.572 PAIN IN JOINT, ANKLE AND FOOT, LEFT: ICD-10-CM

## 2021-08-10 DIAGNOSIS — M25.572 PAIN IN JOINT, ANKLE AND FOOT, LEFT: Primary | ICD-10-CM

## 2021-08-10 PROCEDURE — 73590 X-RAY EXAM OF LOWER LEG: CPT | Mod: LT | Performed by: RADIOLOGY

## 2021-08-10 PROCEDURE — 99024 POSTOP FOLLOW-UP VISIT: CPT | Performed by: ORTHOPAEDIC SURGERY

## 2021-08-10 PROCEDURE — 73700 CT LOWER EXTREMITY W/O DYE: CPT | Mod: LT | Performed by: RADIOLOGY

## 2021-08-10 NOTE — LETTER
8/10/2021         RE: Agustina Julian  [address]  [place] [address]        Dear Colleague,    Thank you for referring your patient, Agustina Julian, to the [facility] [place]. Please see a copy of my visit note below.    CHIEF COMPLAINT:  Status post left tibia and fibula osteotomy performed 05/12/2021.    HISTORY OF PRESENT ILLNESS:  Ms. Julian presents today for further followup.  Reports to be doing well.  Reports to be compliant.    PHYSICAL EXAMINATION:  On today's visit, the patient presents with well-healed skin.  There is some diffuse swelling through the left lower leg.  Alignment is excellent.    IMAGING:  AP and lateral x-rays of the tibia were obtained today, which were significant for showing full consolidation across the fibula osteotomy, and it still is hard to tell exactly how much consolidation there is across the tibia osteotomy.  Hardware is intact and in place.    ASSESSMENT:  Status post left tibia and fibula osteotomy.    PLAN:  I discussed with the patient and her  that at this point, I would like to proceed with a CT scan so we can understand how much weightbearing we can do over the next few weeks.  On today's visit, she was placed in a CAM Walker that she will utilize at all times except for hygiene, resting, sitting and sleeping activities.    The patient will be contacted via phone with regard to the CT scan results.    Regardless of the CT scan results, she will follow up with us in 6 weeks from now, most likely with plain x-rays.    All questions were answered.      Stiven Santiago MD

## 2021-08-10 NOTE — NURSING NOTE
Reason For Visit:   Chief Complaint   Patient presents with     RECHECK     13 week Follow up Left tibia and fibula osteotomy DOS: 5/12/21       There were no vitals taken for this visit.    Pain Assessment  Patient Currently in Pain: Susan Lama, ATC

## 2021-08-10 NOTE — LETTER
[facility] [address]  [address] [facility] [address]  [phone]        August 10, 2021    Regarding:  Agustina Julian  [address]  [place] [address]              To Whom It May Concern;    Agustina Julian will need to continue to be off of work until CT scan results are obtained. A new work note will be provided by 8/16/21.    If you have any questions please call us at [phone].          Electronically signed, Stiven Santiago MD

## 2021-08-10 NOTE — TELEPHONE ENCOUNTER
I called the patient to let her know that Dr. Santiago reviewed her CT scan. She can begin WB as long as she does not have pain. She can work her way up to full weight bearing again as long as there is no pain. I let her know that I wrote her a new work letter that she can access via [employer]. She should also call us back and schedule a follow up with Dr. Santiago for the first week in October.    Emely Lama, ATC

## 2021-08-23 DIAGNOSIS — M25.572 PAIN IN JOINT, ANKLE AND FOOT, LEFT: Primary | ICD-10-CM

## 2021-09-02 ENCOUNTER — NURSE TRIAGE (OUTPATIENT)
Dept: NURSING | Facility: CLINIC | Age: 41
End: 2021-09-02

## 2021-09-07 ENCOUNTER — OFFICE VISIT (OUTPATIENT)
Dept: ORTHOPEDICS | Facility: CLINIC | Age: 41
End: 2021-09-07
Payer: COMMERCIAL

## 2021-09-07 DIAGNOSIS — T79.2XXA SEROMA, POST-TRAUMATIC (H): Primary | ICD-10-CM

## 2021-09-07 PROCEDURE — 29580 STRAPPING UNNA BOOT: CPT | Mod: LT | Performed by: PODIATRIST

## 2021-09-07 NOTE — NURSING NOTE
Reason For Visit:   Chief Complaint   Patient presents with     Follow Up     Wound, left shin.        Pain Assessment  Patient Currently in Pain: Denies        No Known Allergies        Kriss Julian LPN

## 2021-09-07 NOTE — LETTER
9/7/2021         RE: Agustina Julian  [address]  [place] [address]        Dear Colleague,    Thank you for referring your patient, Agustina Julian, to the [facility] [place]. Please see a copy of my visit note below.    Chief Complaint:   Chief Complaint   Patient presents with     Follow Up     Wound, left shin.         No Known Allergies      Subjective: Agustina is a 41 year old female who presents to the clinic today for a follow up of left leg swelling.  She relates that her last appoint with Dr. Gallegos, she was instructed to go into a cam boot.  She relates that after this, her leg started to get a rash on it.  The skin started to get swollen.  Now, she has a small area of fluid collection on the front of her leg.  She relates that it does drain quite significantly.  No pain.    Objective  Data Unavailable Data Unavailable Data Unavailable Data Unavailable Data Unavailable 0 lbs 0 oz      There appears to be a soft tissue lesion on the anterior left leg.  This appears to be a seroma.  I did probe this with a sterile swab and noted significant serous drainage.  After this, there was a reduction in the local swelling.  I discussed with her that we could start an Unna boot on the area and she agrees with this.    Assessment: Agustina is a 41-year-old with seroma on the left leg.  I discussed with her that we could try an Unna boot for a week and see if this helps with the swelling.  She does agree to this.  This is likely secondary from skin irritation from the cam boot.    Plan:   - Pt seen and evaluated  -Valved, multilayered Unna boot was applied to the left leg.  This should be kept clean/dry/intact for the next 7 days.  - Pt to return to clinic in 1 week.    Again, thank you for allowing me to participate in the care of your patient.        Sincerely,        Zay Vines DPM

## 2021-09-08 NOTE — PROGRESS NOTES
Chief Complaint:   Chief Complaint   Patient presents with     Follow Up     Wound, left shin.         No Known Allergies      Subjective: Agustina is a 41 year old female who presents to the clinic today for a follow up of left leg swelling.  She relates that her last appoint with Dr. Gallegos, she was instructed to go into a cam boot.  She relates that after this, her leg started to get a rash on it.  The skin started to get swollen.  Now, she has a small area of fluid collection on the front of her leg.  She relates that it does drain quite significantly.  No pain.    Objective  Data Unavailable Data Unavailable Data Unavailable Data Unavailable Data Unavailable 0 lbs 0 oz      There appears to be a soft tissue lesion on the anterior left leg.  This appears to be a seroma.  I did probe this with a sterile swab and noted significant serous drainage.  After this, there was a reduction in the local swelling.  I discussed with her that we could start an Unna boot on the area and she agrees with this.    Assessment: Agustina is a 41-year-old with seroma on the left leg.  I discussed with her that we could try an Unna boot for a week and see if this helps with the swelling.  She does agree to this.  This is likely secondary from skin irritation from the cam boot.    Plan:   - Pt seen and evaluated  -Valved, multilayered Unna boot was applied to the left leg.  This should be kept clean/dry/intact for the next 7 days.  - Pt to return to clinic in 1 week.

## 2021-09-13 NOTE — PROGRESS NOTES
Chief Complaint   Patient presents with     Wound Check     LLE wound. Concerned about wound. Patient requesting work letter.           No Known Allergies      Subjective: Agustina is a 41 year old female who presents to the clinic today for a follow up of left leg swelling.  She relates that she took the boot off and noticed some wounds on her leg.  No pain to the leg.    Objective      A wound is noted at right  Anterior leg measuring 0.5 x 1.3 x 0.2 cm proximally and 2 cm x 1.6 cm x 0.1 cm distally..    Mitchell Classification: 2    Wound base: Pink/Granulation    Edges: Intact    Drainage: moderate/serous    Odor: No    Undermining: No    Bone Exposure: No    Clinical Signs of Infection: No    After obtaining patient consent, the wound was irrigated with copious amounts of saline.     Assessment: Agustina is a 41-year-old with seroma on the left leg.  I discussed with her that we could try an Unna boot for a week and see if this helps with the swelling.  She does agree to this.  This is likely secondary from skin irritation from the cam boot.    Plan:   - Pt seen and evaluated  -Valved, multilayered Unna boot was applied to the left leg.  This should be kept clean/dry/intact for the next 7 days.  Endoform was used as the wound contact.  - Pt to return to clinic in 1 week.

## 2021-09-14 ENCOUNTER — OFFICE VISIT (OUTPATIENT)
Dept: ORTHOPEDICS | Facility: CLINIC | Age: 41
End: 2021-09-14
Payer: COMMERCIAL

## 2021-09-14 DIAGNOSIS — S81.802D WOUND OF LEFT LOWER EXTREMITY, SUBSEQUENT ENCOUNTER: Primary | ICD-10-CM

## 2021-09-14 PROCEDURE — 99213 OFFICE O/P EST LOW 20 MIN: CPT | Performed by: PODIATRIST

## 2021-09-14 NOTE — LETTER
9/14/2021         RE: Agustina Julian  [address]  [place] [address]        Dear Colleague,    Thank you for referring your patient, Agustina Julian, to the [facility] [place]. Please see a copy of my visit note below.    Chief Complaint   Patient presents with     Wound Check     LLE wound. Concerned about wound. Patient requesting work letter.           No Known Allergies      Subjective: Agustina is a 41 year old female who presents to the clinic today for a follow up of left leg swelling.  She relates that she took the boot off and noticed some wounds on her leg.  No pain to the leg.    Objective      A wound is noted at right  Anterior leg measuring 0.5 x 1.3 x 0.2 cm proximally and 2 cm x 1.6 cm x 0.1 cm distally..    Mitchell Classification: 2    Wound base: Pink/Granulation    Edges: Intact    Drainage: moderate/serous    Odor: No    Undermining: No    Bone Exposure: No    Clinical Signs of Infection: No    After obtaining patient consent, the wound was irrigated with copious amounts of saline.     Assessment: Agustina is a 41-year-old with seroma on the left leg.  I discussed with her that we could try an Unna boot for a week and see if this helps with the swelling.  She does agree to this.  This is likely secondary from skin irritation from the cam boot.    Plan:   - Pt seen and evaluated  -Valved, multilayered Unna boot was applied to the left leg.  This should be kept clean/dry/intact for the next 7 days.  Endoform was used as the wound contact.  - Pt to return to clinic in 1 week.    Again, thank you for allowing me to participate in the care of your patient.        Sincerely,        Zay Vines DPM

## 2021-09-14 NOTE — NURSING NOTE
Reason For Visit:   Chief Complaint   Patient presents with     Wound Check     LLE wound. Concerned about wound. Patient requesting work letter.        Pain Assessment  Patient Currently in Pain: Yes  Primary Pain Location: Ankle (Left)        No Known Allergies        Kriss Julian LPN

## 2021-09-14 NOTE — LETTER
Return to Work  2021     Seen today: yes    Patient:  Agustina Julian  :   1980  MRN:     [id number]  Physician: ZAY COOK may return to work the first week of       The next clinic appointment is scheduled for (date/time) 1 week.    Patient limitations:  None              Electronically signed by Zay Cook DPM

## 2021-09-24 ENCOUNTER — ALLIED HEALTH/NURSE VISIT (OUTPATIENT)
Dept: SURGERY | Facility: CLINIC | Age: 41
End: 2021-09-24
Payer: COMMERCIAL

## 2021-09-24 DIAGNOSIS — S81.802D WOUND OF LEFT LEG, SUBSEQUENT ENCOUNTER: Primary | ICD-10-CM

## 2021-09-24 PROBLEM — L97.922 ULCER OF LEFT LOWER LEG, WITH FAT LAYER EXPOSED (H): Status: ACTIVE | Noted: 2021-09-24

## 2021-09-24 PROCEDURE — 29580 STRAPPING UNNA BOOT: CPT

## 2021-09-24 ASSESSMENT — PAIN SCALES - GENERAL: PAINLEVEL: SEVERE PAIN (6)

## 2021-09-24 NOTE — NURSING NOTE
Patient comes to wound clinic for dressing change  per request of Dr. Zay Vines   She has history of a wound of left lower extremity    Pt assessed for discomfort which is 6/10  which is same intermittent    Dressing removed, wound washed with Microklenz, and measured.     Wound evaluation: 3 small wounds.  One new one proximal.   Size:   Distal: 1.5 cm length x 1 cm width x 0.1 cm depth..  Middle: 0.7 cm length x 1 cm width x 0.1 cm depth. Proximal: 0.5 cm length x 0.5 cm width x 0.1 cm depth.     There is undermining:  No    There is tunneling No     Dressing change:  Compression wrap:  Left leg cleaned with wound spray.  Dressed with endoform on all 3,  valved Unna's boot, Kerlix and Coban wrap.  Allevyn life pad placed between layers of kerlix and coban for drainage.  Patient tolerated well.       PLAN: Dressing changes weekly in clinic    Pt has our number should other issues arise. All questions answered for now.   Patient needs to be seen 1 week.   Treated and follow-up appointment made Debra Choudhary RN  was available for supervision of care if needed or if questions should arise and regarding plan of care.

## 2021-09-24 NOTE — NURSING NOTE
Chief Complaint   Patient presents with     RECHECK     Wound check // LLE // Unna Boot w/ endoform        There were no vitals filed for this visit.    There is no height or weight on file to calculate BMI.          WADE PARKS EMT

## 2021-09-30 ENCOUNTER — OFFICE VISIT (OUTPATIENT)
Dept: WOUND CARE | Facility: CLINIC | Age: 41
End: 2021-09-30
Payer: COMMERCIAL

## 2021-09-30 DIAGNOSIS — S82.202P CLOSED FRACTURE OF SHAFT OF LEFT TIBIA WITH MALUNION, UNSPECIFIED FRACTURE MORPHOLOGY, SUBSEQUENT ENCOUNTER: ICD-10-CM

## 2021-09-30 DIAGNOSIS — L97.922 ULCER OF LEFT LOWER LEG, WITH FAT LAYER EXPOSED (H): Primary | ICD-10-CM

## 2021-09-30 PROCEDURE — 99213 OFFICE O/P EST LOW 20 MIN: CPT | Performed by: PODIATRIST

## 2021-09-30 ASSESSMENT — PAIN SCALES - GENERAL: PAINLEVEL: EXTREME PAIN (8)

## 2021-09-30 NOTE — PROGRESS NOTES
Chief Complaint   Patient presents with     RECHECK     1 week follow up -- unna boot          No Known Allergies      Subjective: Agustina is a 41 year old female who presents to the clinic today for a follow up of left leg swelling.  She relates that the pain is quite significant in her ankle now.  She is wearing a slide today.  She is with her  today.  She relates that the wounds are getting better.  Last time, they just wrapped the wound area because she is having dermatitis in the foot.  She relates that this helped.  She has an appoint with Dr. Santiago on Tuesday.    Objective          A wound is noted at right  Anterior leg measuring 0.5 x 1.3 x 0.2 cm distally and 0.3 cm x 0.4 cm x 0.1 cm proximally.     Mitchell Classification: 2    Wound base: Pink/Granulation    Edges: Intact    Drainage: moderate/serous    Odor: No    Undermining: No    Bone Exposure: No    Clinical Signs of Infection: No    After obtaining patient consent, the wound was irrigated with copious amounts of saline.     Assessment: Agustina is a 41-year-old with seroma on the left leg.  I discussed with her that we could try an Unna boot for a week and see if this helps with the swelling.  She does agree to this.  This is likely secondary from skin irritation from the cam boot.    Plan:   - Pt seen and evaluated  -Valved, multilayered Unna boot was applied to the left leg.  This should be kept clean/dry/intact for the next 5 days.  Endoform was used as the wound contact.  - Pt to return to clinic in 5 days.

## 2021-09-30 NOTE — NURSING NOTE
Chief Complaint   Patient presents with     RECHECK     1 week follow up -- unna boot       There were no vitals filed for this visit.    There is no height or weight on file to calculate BMI.  Per MD. WADE PARKS EMT

## 2021-09-30 NOTE — LETTER
9/30/2021       RE: Agustina Julian  [address]  [place] [address]     Dear Colleague,    Thank you for referring your patient, Agustina Julian, to the [facility] [place] [facility]. Please see a copy of my visit note below.    Chief Complaint   Patient presents with     RECHECK     1 week follow up -- unna boot          No Known Allergies      Subjective: Agustina is a 41 year old female who presents to the clinic today for a follow up of left leg swelling.  She relates that the pain is quite significant in her ankle now.  She is wearing a slide today.  She is with her  today.  She relates that the wounds are getting better.  Last time, they just wrapped the wound area because she is having dermatitis in the foot.  She relates that this helped.  She has an appoint with Dr. Santiago on Tuesday.    Objective          A wound is noted at right  Anterior leg measuring 0.5 x 1.3 x 0.2 cm distally and 0.3 cm x 0.4 cm x 0.1 cm proximally.     Mitchell Classification: 2    Wound base: Pink/Granulation    Edges: Intact    Drainage: moderate/serous    Odor: No    Undermining: No    Bone Exposure: No    Clinical Signs of Infection: No    After obtaining patient consent, the wound was irrigated with copious amounts of saline.     Assessment: Agustina is a 41-year-old with seroma on the left leg.  I discussed with her that we could try an Unna boot for a week and see if this helps with the swelling.  She does agree to this.  This is likely secondary from skin irritation from the cam boot.    Plan:   - Pt seen and evaluated  -Valved, multilayered Unna boot was applied to the left leg.  This should be kept clean/dry/intact for the next 5 days.  Endoform was used as the wound contact.  - Pt to return to clinic in 5 days.      Again, thank you for allowing me to participate in the care of your patient.      Sincerely,    Zay Vines DPM

## 2021-10-05 ENCOUNTER — ANCILLARY PROCEDURE (OUTPATIENT)
Dept: GENERAL RADIOLOGY | Facility: CLINIC | Age: 41
End: 2021-10-05
Attending: ORTHOPAEDIC SURGERY
Payer: COMMERCIAL

## 2021-10-05 ENCOUNTER — OFFICE VISIT (OUTPATIENT)
Dept: ORTHOPEDICS | Facility: CLINIC | Age: 41
End: 2021-10-05
Payer: COMMERCIAL

## 2021-10-05 DIAGNOSIS — M25.572 PAIN IN JOINT, ANKLE AND FOOT, LEFT: Primary | ICD-10-CM

## 2021-10-05 DIAGNOSIS — M25.572 PAIN IN JOINT, ANKLE AND FOOT, LEFT: ICD-10-CM

## 2021-10-05 DIAGNOSIS — S81.802D WOUND OF LEFT LOWER EXTREMITY, SUBSEQUENT ENCOUNTER: Primary | ICD-10-CM

## 2021-10-05 DIAGNOSIS — R60.0 EDEMA LEG: ICD-10-CM

## 2021-10-05 PROCEDURE — 99212 OFFICE O/P EST SF 10 MIN: CPT | Performed by: ORTHOPAEDIC SURGERY

## 2021-10-05 PROCEDURE — 99213 OFFICE O/P EST LOW 20 MIN: CPT | Performed by: PODIATRIST

## 2021-10-05 PROCEDURE — 73590 X-RAY EXAM OF LOWER LEG: CPT | Mod: LT | Performed by: RADIOLOGY

## 2021-10-05 NOTE — PROGRESS NOTES
Chief Complaint   Patient presents with     WOUND CARE     left leg          No Known Allergies      Subjective: Agustina is a 41 year old female who presents to the clinic today for a follow up of left leg swelling.  She relates that she tolerated the dressing well with no complications.  She is with her  today.    Objective      A wound is noted at right  Anterior leg measuring 1.3cm x 0.5cm x 0.1 cm distally and 0.1 cm x 0.1 cm x 0.1 cm proximally.     Mitchell Classification: 2    Wound base: Pink/Granulation    Edges: Intact    Drainage: moderate/serous    Odor: No    Undermining: No    Bone Exposure: No    Clinical Signs of Infection: No    After obtaining patient consent, the wound was irrigated with copious amounts of saline.     Assessment: Agustina is a 41-year-old with seroma on the left leg.  I discussed with her that we could try an Unna boot for a week and see if this helps with the swelling.  She does agree to this.  This is likely secondary from skin irritation from the cam boot.    Plan:   - Pt seen and evaluated  -Valved, multilayered Unna boot was applied to the left leg.  This should be kept clean/dry/intact for the next 5 days.  Endoform was used as the wound contact.  -Prescription was written for compression socks.  - Pt to return to clinic in 7 days.

## 2021-10-05 NOTE — NURSING NOTE
Reason For Visit:   Chief Complaint   Patient presents with     RECHECK     4 months POP left tibia and fibula osteotomy DOS: 5/12/21       Pain Assessment  Patient Currently in Pain: Susan Lama, ATC

## 2021-10-05 NOTE — LETTER
10/5/2021         RE: Agustina Julian  [address]  [place] [address]        Dear Colleague,    Thank you for referring your patient, Agustina Julian, to the [facility] [place]. Please see a copy of my visit note below.    Chief Complaint   Patient presents with     WOUND CARE     left leg          No Known Allergies      Subjective: Agustina is a 41 year old female who presents to the clinic today for a follow up of left leg swelling.  She relates that she tolerated the dressing well with no complications.  She is with her  today.    Objective      A wound is noted at right  Anterior leg measuring 1.3cm x 0.5cm x 0.1 cm distally and 0.1 cm x 0.1 cm x 0.1 cm proximally.     Mitchell Classification: 2    Wound base: Pink/Granulation    Edges: Intact    Drainage: moderate/serous    Odor: No    Undermining: No    Bone Exposure: No    Clinical Signs of Infection: No    After obtaining patient consent, the wound was irrigated with copious amounts of saline.     Assessment: Agustina is a 41-year-old with seroma on the left leg.  I discussed with her that we could try an Unna boot for a week and see if this helps with the swelling.  She does agree to this.  This is likely secondary from skin irritation from the cam boot.    Plan:   - Pt seen and evaluated  -Valved, multilayered Unna boot was applied to the left leg.  This should be kept clean/dry/intact for the next 5 days.  Endoform was used as the wound contact.  -Prescription was written for compression socks.  - Pt to return to clinic in 7 days.    Again, thank you for allowing me to participate in the care of your patient.        Sincerely,        Zay Vines DPM

## 2021-10-05 NOTE — LETTER
10/5/2021     RE: Agustina Julian  [address]  [place] [address]    Dear Colleague,    Thank you for referring your patient, Agustina Julian, to the [facility] [place]. Please see a copy of my visit note below.    CHIEF COMPLAINT:  Status post left tibia and fibula osteotomy performed on 05/12/2021.    HISTORY OF PRESENT ILLNESS:  Ms. Julian presents today for further followup.  Reports to be doing okay, but reports to have pain and discomfort across the ankle joint.  Denies to have any problems at the osteotomy.  Continues working with healing of the skin with Dr. Vines.  Overall, she reports to be making progress.    PHYSICAL EXAMINATION:  On today's visit, she presents with a 1 to 2+ pitting edema.  There is diffuse pain across the ankle joint with some radiation posteriorly.  Alignment is excellent.  There is no pain with palpation of the osteotomy.    IMAGING:  Two views of the left lower leg were obtained today which were significant for showing further callus formation with what seems to be a healed osteotomy without any failure of the hardware.    ASSESSMENT:  Status post left tibia and fibula osteotomy with residual ankle pain.    PLAN:  I discussed with the patient that I believe that she is having pain from an adjustment process given the fact that she has been walking in varus for such a long time.  I recommend her to proceed with a course of physical therapy.    I also recommended to proceed with the use of compression stockings as a way to control her symptoms and swelling.  The stockings will be utilized during daytime hours.    The patient will follow up in 2 months from now, and at that time, 2 views of the left tibia will be obtained.    All questions were answered.    TT:  20 minutes.  CT:  15 minutes.    Stiven Santiago MD

## 2021-10-05 NOTE — PROGRESS NOTES
CHIEF COMPLAINT:  Status post left tibia and fibula osteotomy performed on 05/12/2021.    HISTORY OF PRESENT ILLNESS:  Ms. Julian presents today for further followup.  Reports to be doing okay, but reports to have pain and discomfort across the ankle joint.  Denies to have any problems at the osteotomy.  Continues working with healing of the skin with Dr. Vines.  Overall, she reports to be making progress.    PHYSICAL EXAMINATION:  On today's visit, she presents with a 1 to 2+ pitting edema.  There is diffuse pain across the ankle joint with some radiation posteriorly.  Alignment is excellent.  There is no pain with palpation of the osteotomy.    IMAGING:  Two views of the left lower leg were obtained today which were significant for showing further callus formation with what seems to be a healed osteotomy without any failure of the hardware.    ASSESSMENT:  Status post left tibia and fibula osteotomy with residual ankle pain.    PLAN:  I discussed with the patient that I believe that she is having pain from an adjustment process given the fact that she has been walking in varus for such a long time.  I recommend her to proceed with a course of physical therapy.    I also recommended to proceed with the use of compression stockings as a way to control her symptoms and swelling.  The stockings will be utilized during daytime hours.    The patient will follow up in 2 months from now, and at that time, 2 views of the left tibia will be obtained.    All questions were answered.    TT:  20 minutes.  CT:  15 minutes.

## 2021-10-10 ENCOUNTER — HEALTH MAINTENANCE LETTER (OUTPATIENT)
Age: 41
End: 2021-10-10

## 2021-10-14 ENCOUNTER — OFFICE VISIT (OUTPATIENT)
Dept: WOUND CARE | Facility: CLINIC | Age: 41
End: 2021-10-14
Payer: COMMERCIAL

## 2021-10-14 DIAGNOSIS — L97.922 ULCER OF LEFT LOWER LEG, WITH FAT LAYER EXPOSED (H): Primary | ICD-10-CM

## 2021-10-14 PROCEDURE — 99213 OFFICE O/P EST LOW 20 MIN: CPT | Performed by: PODIATRIST

## 2021-10-14 ASSESSMENT — PAIN SCALES - GENERAL: PAINLEVEL: EXTREME PAIN (8)

## 2021-10-14 NOTE — NURSING NOTE
Chief Complaint   Patient presents with     RECHECK     Agustina, is being seen today for a follow up left leg wound.       There were no vitals filed for this visit.    There is no height or weight on file to calculate BMI.    Per Provider  Anna Harper LPN

## 2021-10-14 NOTE — LETTER
10/14/2021       RE: Agustina Julian  [address]  [place] [address]     Dear Colleague,    Thank you for referring your patient, Agustina Julian, to the [facility] [place] [facility]. Please see a copy of my visit note below.    Chief Complaint   Patient presents with     RECHECK     Agustina, is being seen today for a follow up left leg wound.          No Known Allergies      Subjective: Agustina is a 41 year old female who presents to the clinic today for a follow up of left leg swelling.  She relates that she tolerated the dressing well with no complications.  She is with her  today.    Objective          A wound is noted at left Anterior leg measuring 1.3cm x 0.5cm x 0.1 cm distally and 0.1 cm x 0.1 cm x 0.1 cm proximally.     Mitchell Classification: 2    Wound base: Pink/Granulation    Edges: Intact    Drainage: moderate/serous    Odor: No    Undermining: No    Bone Exposure: No    Clinical Signs of Infection: No    After obtaining patient consent, the wound was irrigated with copious amounts of saline.     Assessment: Agustina is a 41-year-old with seroma on the left leg.  I discussed with her that we could try an Unna boot for a week and see if this helps with the swelling. However, we need the boot to fully emcompass her entire leg.  She does agree to this.  This is likely secondary from skin irritation from the cam boot.    Plan:   - Pt seen and evaluated  -Valved, multilayered Unna boot was applied to the left leg.  This should be kept clean/dry/intact for the next 5 days.  Korin was used as the wound contact.  - Pt to return to clinic in 7 days.      Again, thank you for allowing me to participate in the care of your patient.      Sincerely,    Zay Vines DPM

## 2021-10-14 NOTE — PROGRESS NOTES
Chief Complaint   Patient presents with     RECHECK     Agustina, is being seen today for a follow up left leg wound.          No Known Allergies      Subjective: Agustina is a 41 year old female who presents to the clinic today for a follow up of left leg swelling.  She relates that she tolerated the dressing well with no complications.  She is with her  today.    Objective          A wound is noted at left Anterior leg measuring 1.3cm x 0.5cm x 0.1 cm distally and 0.1 cm x 0.1 cm x 0.1 cm proximally.     Mitchell Classification: 2    Wound base: Pink/Granulation    Edges: Intact    Drainage: moderate/serous    Odor: No    Undermining: No    Bone Exposure: No    Clinical Signs of Infection: No    After obtaining patient consent, the wound was irrigated with copious amounts of saline.     Assessment: Agustina is a 41-year-old with seroma on the left leg.  I discussed with her that we could try an Unna boot for a week and see if this helps with the swelling. However, we need the boot to fully emcompass her entire leg.  She does agree to this.  This is likely secondary from skin irritation from the cam boot.    Plan:   - Pt seen and evaluated  -Valved, multilayered Unna boot was applied to the left leg.  This should be kept clean/dry/intact for the next 5 days.  Korin was used as the wound contact.  - Pt to return to clinic in 7 days.

## 2021-10-18 ENCOUNTER — THERAPY VISIT (OUTPATIENT)
Dept: PHYSICAL THERAPY | Facility: CLINIC | Age: 41
End: 2021-10-18
Attending: ORTHOPAEDIC SURGERY
Payer: COMMERCIAL

## 2021-10-18 DIAGNOSIS — M25.572 PAIN IN JOINT, ANKLE AND FOOT, LEFT: ICD-10-CM

## 2021-10-18 PROCEDURE — 97110 THERAPEUTIC EXERCISES: CPT | Mod: GP

## 2021-10-18 PROCEDURE — 97161 PT EVAL LOW COMPLEX 20 MIN: CPT | Mod: GP

## 2021-10-18 NOTE — PROGRESS NOTES
Physical Therapy Initial Evaluation  Subjective:  The history is provided by the patient. No  was used.   Therapist Generated HPI Evaluation  Problem details: Pt started walking w/ CAM boot in August after surgery on 5/12/2021, this is when ankle started hurting. Hurts with DF during gait. Pt currently working 8 hour shifts, able to sit enough during work that pain is tolerable. With rest ankle has no pain, with walking and going down stairs 9/10 pain.     Xray Impression 10/5/2021:  Osteotomy and ORIF changes of the tibial shaft with decreased  osteotomy lucency. Stable alignment..         Type of problem:  Left ankle.    This is a chronic condition.  Occurance: post op pain.    Patient reports pain:  Anterior, lateral and posterior.  Pain is described as aching and sharp (sharp in the back) and is intermittent.  Pain radiates to:  No radiation.   Since onset symptoms are unchanged.  Symptoms are exacerbated by walking and descending stairs  Relieved by: stretching, elevation.  Special tests included:  X-ray and CT scan (see above).    Restrictions due to condition include:  Working in normal job without restrictions.  Barriers include:  None as reported by patient.    Patient Health History    Problem began: 10/5/2021 (MD order).      Pain is reported as 9/10 on pain scale.  General health as reported by patient is good.  Pertinent medical history includes: history of fractures.      Other medical allergies details: Left tibia and fibular osteotomy w/ hx of fracture malunion in [place] 2004.   Surgeries include:  Orthopedic surgery.    Current medications:  High blood pressure medication.    Current occupation is [employer].   Primary job tasks include:  Pushing/pulling.                                    Objective:    Gait:  Non antalgic              Ankle/Foot Evaluation  ROM:  Arom ankle eval: very minimal INV/EVR/PF ROM d/t wound care wrap.  AROM:    Dorsiflexion:  Left:   6, likely somewhat limited  d/t wound care wrap  Right:   10  Plantarflexion: Left:     Right:  35  Inversion: Left:      Right:  32  Eversion:     Right:  20        Strength:    Dorsiflexion:  Left: 4+/5     Pain:   Right: 5/5   Pain:    Inversion:Left: 4/5  Pain:     Right: 5/5  Pain:  Eversion:Left: 4/5  Pain:  Right: 5/5  Pain:              Strength wnl ankle: PF: completes B heel raises x 10. No pain with resisted motions.   LIGAMENT TESTING: Ligament testing ankle: unable to accurately assess d/t wound care wrap.                      FUNCTIONAL TESTS:           Proprioception:  Teachbase Balance Test: % of Uninvolved: increased difficulty L>R, holds ~10 sec                                                    General     ROS     Eval Summary: Pt presents with L limited ankle ROM partially d/t wound care wrap, L ankle weakness and decreased balance s/p L tibia and fibula osteotomy in May of this year that was completed to fix malunion from ankle fracture in [facility] in 2004. This had altered pt gait pattern for years. Pt now has pain with ambulation post op and will benefit from skilled PT for strengthening, ROM, balance and gait training for return to independent functioning.     Assessment/Plan:    Patient is a 41 year old female with left side ankle complaints.    Patient has the following significant findings with corresponding treatment plan.                Diagnosis 1:  S/p L tibia and fibula osteotomy DOS 5/12/2021  Pain -  hot/cold therapy, manual therapy, splint/taping/bracing/orthotics, self management, education and home program  Decreased ROM/flexibility - manual therapy, therapeutic exercise and home program  Decreased joint mobility - manual therapy, therapeutic exercise, therapeutic activity and home program  Decreased strength - therapeutic exercise, therapeutic activities and home program  Impaired balance - neuro re-education, therapeutic activities and home program  Edema - cold therapy  Impaired gait - gait training    Therapy  Evaluation Codes:   Cumulative Therapy Evaluation is: Low complexity.    Previous and current functional limitations:  (See Goal Flow Sheet for this information)    Short term and Long term goals: (See Goal Flow Sheet for this information)     Communication ability:  Patient appears to be able to clearly communicate and understand verbal and written communication and follow directions correctly.  Treatment Explanation - The following has been discussed with the patient:   RX ordered/plan of care  Anticipated outcomes  Possible risks and side effects  This patient would benefit from PT intervention to resume normal activities.   Rehab potential is excellent.    Frequency:  1 X week, once daily  Duration:  for 4 weeks tapering to 2 X a month over 4 weeks  Discharge Plan:  Achieve all LTG.  Independent in home treatment program.  Reach maximal therapeutic benefit.    Please refer to the daily flowsheet for treatment today, total treatment time and time spent performing 1:1 timed codes.

## 2021-10-19 DIAGNOSIS — I10 ESSENTIAL HYPERTENSION: ICD-10-CM

## 2021-10-20 RX ORDER — AMLODIPINE BESYLATE 10 MG/1
TABLET ORAL
Qty: 90 TABLET | Refills: 0 | Status: SHIPPED | OUTPATIENT
Start: 2021-10-20 | End: 2022-02-02

## 2021-10-25 ENCOUNTER — ALLIED HEALTH/NURSE VISIT (OUTPATIENT)
Dept: SURGERY | Facility: CLINIC | Age: 41
End: 2021-10-25
Payer: COMMERCIAL

## 2021-10-25 DIAGNOSIS — S81.802D WOUND OF LEFT LEG, SUBSEQUENT ENCOUNTER: Primary | ICD-10-CM

## 2021-10-25 PROCEDURE — 99207 PR NO CHARGE NURSE ONLY: CPT

## 2021-10-28 ENCOUNTER — THERAPY VISIT (OUTPATIENT)
Dept: PHYSICAL THERAPY | Facility: CLINIC | Age: 41
End: 2021-10-28
Payer: COMMERCIAL

## 2021-10-28 ENCOUNTER — OFFICE VISIT (OUTPATIENT)
Dept: WOUND CARE | Facility: CLINIC | Age: 41
End: 2021-10-28
Payer: COMMERCIAL

## 2021-10-28 DIAGNOSIS — M25.572 PAIN IN JOINT, ANKLE AND FOOT, LEFT: Primary | ICD-10-CM

## 2021-10-28 DIAGNOSIS — L97.922 ULCER OF LEFT LOWER LEG, WITH FAT LAYER EXPOSED (H): Primary | ICD-10-CM

## 2021-10-28 DIAGNOSIS — S82.202P CLOSED FRACTURE OF SHAFT OF LEFT TIBIA WITH MALUNION, UNSPECIFIED FRACTURE MORPHOLOGY, SUBSEQUENT ENCOUNTER: ICD-10-CM

## 2021-10-28 PROCEDURE — 97110 THERAPEUTIC EXERCISES: CPT | Mod: GP

## 2021-10-28 PROCEDURE — 29580 STRAPPING UNNA BOOT: CPT | Mod: LT | Performed by: PODIATRIST

## 2021-10-28 ASSESSMENT — PAIN SCALES - GENERAL
PAINLEVEL: NO PAIN (0)
PAINLEVEL: SEVERE PAIN (7)

## 2021-10-28 NOTE — LETTER
10/28/2021       RE: Agustina Julian  [address]  [place] [address]     Dear Colleague,    Thank you for referring your patient, Agustina Julian, to the [facility] [place] at [facility]. Please see a copy of my visit note below.    Chief Complaint   Patient presents with     RECHECK     Agustina, is being seen today for a left leg wound, unna boot.          No Known Allergies      Subjective: Agustina is a 41 year old female who presents to the clinic today for a follow up of left leg swelling.  She relates that she tolerated the dressing well with no complications. Wounds persist. 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     Objective      Wounds noted to left anterior leg. That are approximately 0.1cm deep.     Mitchell Classification: 2    Wound base: Pink/Granulation    Edges: Intact    Drainage: moderate/serous    Odor: No    Undermining: No    Bone Exposure: No    Clinical Signs of Infection: No    After obtaining patient consent, the wound was irrigated with copious amounts of saline.     Assessment: Agustina is a 41-year-old with seroma on the left leg.  I discussed with her that we could try an Unna boot for a week and see if this helps with the swelling. However, we need the boot to fully emcompass her entire leg.  She does agree to this. I am concerned that there is an underlying process that is impeding healing. Will obtain MRI. Hopefully will be able to visualize the soft tissue with metallic interference.     Plan:   - Pt seen and evaluated  -Valved, multilayered Unna boot was applied to the left leg.  This should be kept clean/dry/intact for the next 5 days.  Hydrofera Bluewas used as the wound contact.  - Pt to return to clinic in 7 days.      Again, thank you for allowing me to participate in the care of your patient.      Sincerely,    Zay Vines DPM

## 2021-10-28 NOTE — NURSING NOTE
Chief Complaint   Patient presents with     RECHECK     Agustina, is being seen today for a left leg wound, unna boot.       There were no vitals filed for this visit.    There is no height or weight on file to calculate BMI.    Per Provider.  Anna Harper LPN

## 2021-10-28 NOTE — PROGRESS NOTES
Chief Complaint   Patient presents with     RECHECK     Agustina, is being seen today for a left leg wound, unna boot.          No Known Allergies      Subjective: Agustina is a 41 year old female who presents to the clinic today for a follow up of left leg swelling.  She relates that she tolerated the dressing well with no complications. Wounds persist. 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     Objective      Wounds noted to left anterior leg. That are approximately 0.1cm deep.     Mitchell Classification: 2    Wound base: Pink/Granulation    Edges: Intact    Drainage: moderate/serous    Odor: No    Undermining: No    Bone Exposure: No    Clinical Signs of Infection: No    After obtaining patient consent, the wound was irrigated with copious amounts of saline.     Assessment: Agustina is a 41-year-old with seroma on the left leg.  I discussed with her that we could try an Unna boot for a week and see if this helps with the swelling. However, we need the boot to fully emcompass her entire leg.  She does agree to this. I am concerned that there is an underlying process that is impeding healing. Will obtain MRI. Hopefully will be able to visualize the soft tissue with metallic interference.     Plan:   - Pt seen and evaluated  -Valved, multilayered Unna boot was applied to the left leg.  This should be kept clean/dry/intact for the next 5 days.  Hydrofera Bluewas used as the wound contact.  - Pt to return to clinic in 7 days.

## 2021-10-29 ENCOUNTER — ANCILLARY PROCEDURE (OUTPATIENT)
Dept: MRI IMAGING | Facility: CLINIC | Age: 41
End: 2021-10-29
Attending: PODIATRIST
Payer: COMMERCIAL

## 2021-10-29 ENCOUNTER — ALLIED HEALTH/NURSE VISIT (OUTPATIENT)
Dept: SURGERY | Facility: CLINIC | Age: 41
End: 2021-10-29
Payer: COMMERCIAL

## 2021-10-29 DIAGNOSIS — L97.922 ULCER OF LEFT LOWER LEG, WITH FAT LAYER EXPOSED (H): ICD-10-CM

## 2021-10-29 DIAGNOSIS — S82.202P CLOSED FRACTURE OF SHAFT OF LEFT TIBIA WITH MALUNION, UNSPECIFIED FRACTURE MORPHOLOGY, SUBSEQUENT ENCOUNTER: ICD-10-CM

## 2021-10-29 DIAGNOSIS — S81.802D WOUND OF LEFT LEG, SUBSEQUENT ENCOUNTER: Primary | ICD-10-CM

## 2021-10-29 PROCEDURE — 73718 MRI LOWER EXTREMITY W/O DYE: CPT | Mod: LT | Performed by: RADIOLOGY

## 2021-10-29 PROCEDURE — 99207 PR NO CHARGE NURSE ONLY: CPT

## 2021-10-29 ASSESSMENT — PAIN SCALES - GENERAL: PAINLEVEL: NO PAIN (0)

## 2021-10-29 NOTE — NURSING NOTE
Chief Complaint   Patient presents with     RECHECK     1 week left leg unna       There were no vitals filed for this visit.    There is no height or weight on file to calculate BMI.          WADE PARKS EMT

## 2021-10-29 NOTE — NURSING NOTE
Chief Complaint   Patient presents with     RECHECK     unna boot change after imaging       There were no vitals filed for this visit.    There is no height or weight on file to calculate BMI.      Anna Harper LPN

## 2021-10-29 NOTE — PROGRESS NOTES
Patient comes to wound clinic for dressing change  per request of   She has history of a Venous leg/foot ulcer    Pt assessed for discomfort which is 1/10  which is same    Compression wrap:  Left leg dressing cleaned with wound spray.  Dressed with  valved Unna's boot, Kerlix and Coban wrap.  Patient tolerated well.       PLAN: Dressing changes weekly in clinic    Pt has our number should other issues arise. All questions answered for now.   Patient needs to be seen weekly  .   Treated and follow-up appointment made Kirti Coronel was available for supervision of care if needed or if questions should arise and regarding plan of care.

## 2021-10-29 NOTE — PROGRESS NOTES
Patient comes to wound clinic for dressing change  per request of Dr. Zay Vines   She has history of a wound of left lower extremity    Pt assessed for discomfort which is 6/10  which is same intermittent    Dressing removed, wound washed with Microklenz, and measured.     Wound evaluation: 3 small wounds.  One new one proximal.   Size:   Distal: 1.5 cm length x 1 cm width x 0.1 cm depth..  Middle: 0.7 cm length x 1 cm width x 0.1 cm depth. Proximal: 0.5 cm length x 0.5 cm width x 0.1 cm depth.     There is undermining:  No    There is tunneling No     Dressing change:  Compression wrap:  Left leg cleaned with wound spray.  Valved Unna's boot, Kerlix and Coban wrap.    Patient tolerated well.       PLAN: Dressing changes weekly in clinic    Pt has our number should other issues arise. All questions answered for now.   Patient needs to be seen 1 week.   Treated and follow-up appointment made Debra Choudhary RN  was available for supervision of care if needed or if questions should arise and regarding plan of care.

## 2021-11-04 ENCOUNTER — OFFICE VISIT (OUTPATIENT)
Dept: WOUND CARE | Facility: CLINIC | Age: 41
End: 2021-11-04
Payer: COMMERCIAL

## 2021-11-04 DIAGNOSIS — S82.202P CLOSED FRACTURE OF SHAFT OF LEFT TIBIA WITH MALUNION, UNSPECIFIED FRACTURE MORPHOLOGY, SUBSEQUENT ENCOUNTER: ICD-10-CM

## 2021-11-04 DIAGNOSIS — L97.922 ULCER OF LEFT LOWER LEG, WITH FAT LAYER EXPOSED (H): Primary | ICD-10-CM

## 2021-11-04 PROCEDURE — 99213 OFFICE O/P EST LOW 20 MIN: CPT | Performed by: PODIATRIST

## 2021-11-04 NOTE — PROGRESS NOTES
Chief Complaint   Patient presents with     WOUND CARE     left leg            No Known Allergies      Subjective: Agustina is a 41 year old female who presents to the clinic today for a follow up of left leg swelling.  She relates that she tolerated the dressing well with no complications. Wounds persist. 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     Objective            Wounds noted to left anterior leg. That are approximately 0.1cm deep.     Mitchell Classification: 2    Wound base: Pink/Granulation    Edges: Intact    Drainage: moderate/serous    Odor: No    Undermining: No    Bone Exposure: No    Clinical Signs of Infection: No    After obtaining patient consent, the wound was irrigated with copious amounts of saline.     MRI Impression:     Osteotomy and ORIF changes in the middle third of the left tibia.  Apparent skin ulcer with sinus tract tracking towards and possibly  through the anterior cortex approximately at the level of the  osteotomy (series 6 image 31). MRI without and with IV contrast may be  helpful for further soft tissue assessment. No clear subcutaneous  fluid collection on this noncontrast study.     Adjacent marrow signal potential changes compromised by artifact.  Questionable areas of abnormal T1 marrow signal change, osteomyelitis  difficult to exclude.     TELMA WOLF MD (Joe)     Assessment: Agustina is a 41-year-old with seroma on the left leg.  I discussed with her that we could try an Unna boot for a week and see if this helps with the swelling. However, we need the boot to fully emcompass her entire leg.  She does agree to this. MRI shows likely connection of the skin to the underlying bone. I discussed this with her and her . I would like her to see Dr. Santiago sooner than her next available appt to get his opinion.     Plan:   - Pt seen and evaluated  -Valved, multilayered Unna boot was applied to the left leg.  This should be kept clean/dry/intact for the  next 5 days.  - MRI was discussed with her.   - Pt to return to clinic in 7 days.

## 2021-11-04 NOTE — LETTER
11/4/2021       RE: Agustina Julian  [address]  [place] [address]     Dear Colleague,    Thank you for referring your patient, Agustina Julian, to the [facility] [place] at [facility]. Please see a copy of my visit note below.    Chief Complaint   Patient presents with     WOUND CARE     left leg            No Known Allergies      Subjective: Agustina is a 41 year old female who presents to the clinic today for a follow up of left leg swelling.  She relates that she tolerated the dressing well with no complications. Wounds persist. 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     Objective            Wounds noted to left anterior leg. That are approximately 0.1cm deep.     Imtchell Classification: 2    Wound base: Pink/Granulation    Edges: Intact    Drainage: moderate/serous    Odor: No    Undermining: No    Bone Exposure: No    Clinical Signs of Infection: No    After obtaining patient consent, the wound was irrigated with copious amounts of saline.     MRI Impression:     Osteotomy and ORIF changes in the middle third of the left tibia.  Apparent skin ulcer with sinus tract tracking towards and possibly  through the anterior cortex approximately at the level of the  osteotomy (series 6 image 31). MRI without and with IV contrast may be  helpful for further soft tissue assessment. No clear subcutaneous  fluid collection on this noncontrast study.     Adjacent marrow signal potential changes compromised by artifact.  Questionable areas of abnormal T1 marrow signal change, osteomyelitis  difficult to exclude.     TELMA (Katerine WOLF MD     Assessment: Agustina is a 41-year-old with seroma on the left leg.  I discussed with her that we could try an Unna boot for a week and see if this helps with the swelling. However, we need the boot to fully emcompass her entire leg.  She does agree to this. MRI shows likely connection of the skin  to the underlying bone. I discussed this with her and her . I would like her to see Dr. Santiago sooner than her next available appt to get his opinion.     Plan:   - Pt seen and evaluated  -Valved, multilayered Unna boot was applied to the left leg.  This should be kept clean/dry/intact for the next 5 days.  - MRI was discussed with her.   - Pt to return to clinic in 7 days.      Again, thank you for allowing me to participate in the care of your patient.      Sincerely,    Zay Vines DPM

## 2021-11-11 ENCOUNTER — OFFICE VISIT (OUTPATIENT)
Dept: WOUND CARE | Facility: CLINIC | Age: 41
End: 2021-11-11
Payer: COMMERCIAL

## 2021-11-11 DIAGNOSIS — L97.922 ULCER OF LEFT LOWER LEG, WITH FAT LAYER EXPOSED (H): Primary | ICD-10-CM

## 2021-11-11 PROCEDURE — 99213 OFFICE O/P EST LOW 20 MIN: CPT | Performed by: PODIATRIST

## 2021-11-11 ASSESSMENT — PAIN SCALES - GENERAL: PAINLEVEL: NO PAIN (0)

## 2021-11-11 NOTE — NURSING NOTE
Chief Complaint   Patient presents with     RECHECK     1 week follow up LLE unna boot change.       There were no vitals filed for this visit.    There is no height or weight on file to calculate BMI.    Per provider.  Anna Harper LPN

## 2021-11-11 NOTE — LETTER
11/11/2021       RE: Agustina Julian  [address]  [place] [address]     Dear Colleague,    Thank you for referring your patient, Agustina Julian, to the [facility] CL[facility] [place] at [facility]. Please see a copy of my visit note below.    Chief Complaint   Patient presents with     RECHECK     1 week follow up LLE unna boot change.            No Known Allergies      Subjective: Agustina is a 41 year old female who presents to the clinic today for a follow up of left leg swelling.  She relates that she tolerated the dressing well but anterior ankle was itchy.     Objective            Wounds noted to left anterior leg measuring 0.2cm x 0.2cm x 0.4cm.    Mitchell Classification: 2    Wound base: Pink/Granulation    Edges: Intact    Drainage: moderate/serous    Odor: No    Undermining: No    Bone Exposure: No    Clinical Signs of Infection: No    After obtaining patient consent, the wound was irrigated with copious amounts of saline.     MRI Impression:     Osteotomy and ORIF changes in the middle third of the left tibia.  Apparent skin ulcer with sinus tract tracking towards and possibly  through the anterior cortex approximately at the level of the  osteotomy (series 6 image 31). MRI without and with IV contrast may be  helpful for further soft tissue assessment. No clear subcutaneous  fluid collection on this noncontrast study.     Adjacent marrow signal potential changes compromised by artifact.  Questionable areas of abnormal T1 marrow signal change, osteomyelitis  difficult to exclude.     TELMA (Katerine WOLF MD     Assessment: Agustina is a 41-year-old with seroma on the left leg.  I discussed with her that we could try an Unna boot for a week and see if this helps with the swelling. However, we need the boot to fully emcompass her entire leg.  She does agree to this. MRI shows likely connection of the skin to the underlying bone. I discussed this with  her and her . I would like her to see Dr. Santiago sooner than her next available appt to get his opinion.     Plan:   - Pt seen and evaluated  -Sween to the anterior ankle and then the leg was wrapped in a Profore boot.  - Pt to return to clinic on Tuesday with Dr. Santiago.      Again, thank you for allowing me to participate in the care of your patient.      Sincerely,    Zay Vines DPM

## 2021-11-11 NOTE — PROGRESS NOTES
Chief Complaint   Patient presents with     RECHECK     1 week follow up LLE unna boot change.            No Known Allergies      Subjective: Agustina is a 41 year old female who presents to the clinic today for a follow up of left leg swelling.  She relates that she tolerated the dressing well but anterior ankle was itchy.     Objective            Wounds noted to left anterior leg measuring 0.2cm x 0.2cm x 0.4cm.    Mitchell Classification: 2    Wound base: Pink/Granulation    Edges: Intact    Drainage: moderate/serous    Odor: No    Undermining: No    Bone Exposure: No    Clinical Signs of Infection: No    After obtaining patient consent, the wound was irrigated with copious amounts of saline.     MRI Impression:     Osteotomy and ORIF changes in the middle third of the left tibia.  Apparent skin ulcer with sinus tract tracking towards and possibly  through the anterior cortex approximately at the level of the  osteotomy (series 6 image 31). MRI without and with IV contrast may be  helpful for further soft tissue assessment. No clear subcutaneous  fluid collection on this noncontrast study.     Adjacent marrow signal potential changes compromised by artifact.  Questionable areas of abnormal T1 marrow signal change, osteomyelitis  difficult to exclude.     TELMA WOLF MD (Joe)     Assessment: Agustina is a 41-year-old with seroma on the left leg.  I discussed with her that we could try an Unna boot for a week and see if this helps with the swelling. However, we need the boot to fully emcompass her entire leg.  She does agree to this. MRI shows likely connection of the skin to the underlying bone. I discussed this with her and her . I would like her to see Dr. Santiago sooner than her next available appt to get his opinion.     Plan:   - Pt seen and evaluated  -Sween to the anterior ankle and then the leg was wrapped in a Profore boot.  - Pt to return to clinic on Tuesday with Dr. Santiago.

## 2021-11-16 ENCOUNTER — OFFICE VISIT (OUTPATIENT)
Dept: ORTHOPEDICS | Facility: CLINIC | Age: 41
End: 2021-11-16
Payer: COMMERCIAL

## 2021-11-16 VITALS — WEIGHT: 260 LBS | HEIGHT: 68 IN | BODY MASS INDEX: 39.4 KG/M2

## 2021-11-16 DIAGNOSIS — M25.572 PAIN IN JOINT, ANKLE AND FOOT, LEFT: Primary | ICD-10-CM

## 2021-11-16 PROCEDURE — 99213 OFFICE O/P EST LOW 20 MIN: CPT | Performed by: ORTHOPAEDIC SURGERY

## 2021-11-16 ASSESSMENT — MIFFLIN-ST. JEOR: SCORE: 1887.85

## 2021-11-16 NOTE — NURSING NOTE
Teaching Flowsheet   Relevant Diagnosis: L tibia infection  Teaching Topic: L tibia I&D and Hardware removal     RN Note: Pt is calm and cooperative, asks questions appropriately. Pt and partner educated on pre-surgical packet, verbalized understanding of H&P requirement, COVID-19 test, NPO requirement, and pre-surgical scrub. Pt will schedule H&P within 30 days of surgery, pt will schedule COVID test within 3-4 days of surgery. Pt to call Vanessa in scheduling when date is decided. Pt's wound was cleansed with MicroKlenz, following guidelines from previous wound note, 4x4 gauze applied, wrapped with kerlix.      Person(s) involved in teaching:   Patient and      Motivation Level:  Asks Questions: Yes  Eager to Learn: Yes  Cooperative: Yes  Receptive (willing/able to accept information): Yes  Any cultural factors/[employer] beliefs that may influence understanding or compliance? No     Patient demonstrates understanding of the following:  Reason for the appointment, diagnosis and treatment plan: Yes  Knowledge of proper use of medications and conditions for which they are ordered (with special attention to potential side effects or drug interactions): Yes  Which situations necessitate calling provider and whom to contact: Yes       Proper use and care of(medical equip, care aids, etc.): Yes  Nutritional needs and diet plan: Yes  Pain management techniques: Yes  Wound Care: Yes  How and/when to access community resources: Yes     Riley Duarte RNCC

## 2021-11-16 NOTE — PROGRESS NOTES
"Reason For Visit:   Chief Complaint   Patient presents with     RECHECK     Left lower leg wound check       Ht 1.727 m (5' 8\")   Wt 117.9 kg (260 lb)   BMI 39.53 kg/m           Melissa Gu ATC    "

## 2021-11-16 NOTE — PROGRESS NOTES
CHIEF COMPLAINT:  Status post left tibia osteotomy performed on 05/12/2021.    HISTORY OF PRESENT ILLNESS:  Ms. Julian presents today for further followup at the recommendation of Dr. Vines.    Reports to continue having drainage.  Reports to have some minor pain.  An MRI has been obtained, and there is a concern about having some communication of the wound with the anterior portion of the tibia.    PHYSICAL EXAMINATION:  On today's exam, she presents with an open wound of approximately 1.5 cm along the first and most anterior surgical incision.  That is not the incision we addressed the osteotomy through.  Presents with some fluid I could express through different parts of the surgical incision.  There is no redness.  There is no warmth.  There is some diffuse swelling.    ASSESSMENT:  Status post left tibia and fibula osteotomy with possible deep infection.    PLAN:  Discussed with the patient and her  that, at this point, the recommendation will be to undergo an I&D of the left tibia with hardware removal.  I believe that she has been going back and forth with these wounds and we need to find a more definitive solution for her to make progress.    I discussed with them the most likely postoperative course and complications, which include, but are not limited to infection, bleeding, nerve damage, residual pain and wound dehiscence.    All questions were answered.  The patient was pleased with the discussion.  She has no restrictions and she will schedule the surgery at the best of her convenience, which again will consist of a left tibia I&D.     TT:  20 minutes.  CT:  15 minutes.

## 2021-11-16 NOTE — LETTER
"    11/16/2021         RE: Agustina Julian  [address]  [place] [address]        Dear Colleague,    Thank you for referring your patient, Agustina Julian, to the [facility]TH[facility] [place]. Please see a copy of my visit note below.    Reason For Visit:   Chief Complaint   Patient presents with     RECHECK     Left lower leg wound check       Ht 1.727 m (5' 8\")   Wt 117.9 kg (260 lb)   BMI 39.53 kg/m           Melissa Gu ATC      CHIEF COMPLAINT:  Status post left tibia osteotomy performed on 05/12/2021.    HISTORY OF PRESENT ILLNESS:  Ms. Julian presents today for further followup at the recommendation of Dr. Vines.    Reports to continue having drainage.  Reports to have some minor pain.  An MRI has been obtained, and there is a concern about having some communication of the wound with the anterior portion of the tibia.    PHYSICAL EXAMINATION:  On today's exam, she presents with an open wound of approximately 1.5 cm along the first and most anterior surgical incision.  That is not the incision we addressed the osteotomy through.  Presents with some fluid I could express through different parts of the surgical incision.  There is no redness.  There is no warmth.  There is some diffuse swelling.    ASSESSMENT:  Status post left tibia and fibula osteotomy with possible deep infection.    PLAN:  Discussed with the patient and her  that, at this point, the recommendation will be to undergo an I&D of the left tibia with hardware removal.  I believe that she has been going back and forth with these wounds and we need to find a more definitive solution for her to make progress.    I discussed with them the most likely postoperative course and complications, which include, but are not limited to infection, bleeding, nerve damage, residual pain and wound dehiscence.    All questions were answered.  The patient was pleased with the discussion.  She has no restrictions and she will " schedule the surgery at the best of her convenience, which again will consist of a left tibia I&D.     TT:  20 minutes.  CT:  15 minutes.        Stiven Santiago MD

## 2021-11-17 ENCOUNTER — PREP FOR PROCEDURE (OUTPATIENT)
Dept: ORTHOPEDICS | Facility: CLINIC | Age: 41
End: 2021-11-17
Payer: COMMERCIAL

## 2021-11-17 DIAGNOSIS — M25.572 PAIN IN JOINT, ANKLE AND FOOT, LEFT: Primary | ICD-10-CM

## 2021-11-18 DIAGNOSIS — Z11.59 ENCOUNTER FOR SCREENING FOR OTHER VIRAL DISEASES: ICD-10-CM

## 2021-11-22 ENCOUNTER — TELEPHONE (OUTPATIENT)
Dept: ORTHOPEDICS | Facility: CLINIC | Age: 41
End: 2021-11-22
Payer: COMMERCIAL

## 2021-11-22 NOTE — TELEPHONE ENCOUNTER
Patient is scheduled for surgery with Dr. Santiago    Spoke with: Patient's Frankie    Date of Surgery: 12/8/21    Location: [facility]    Informed patient they will need an adult  : Yes    Post ops: 2 & 6 weeks    Pre op with Provider: Complete    H&P: Scheduled with PAC 12/2/21    Pre-procedure COVID-19 Test: 12/4/21 [facility]    Additional imaging/appointments: N/A    Surgery packet: Received in clinic     Additional comments: N/A

## 2021-11-23 ENCOUNTER — TELEPHONE (OUTPATIENT)
Dept: ORTHOPEDICS | Facility: CLINIC | Age: 41
End: 2021-11-23
Payer: COMMERCIAL

## 2021-11-23 NOTE — TELEPHONE ENCOUNTER
FUTURE VISIT INFORMATION      SURGERY INFORMATION:    Date: 12/8/21    Location: [facility] OR    Surgeon:  Stiven Santiago MD    Anesthesia Type:  Choice    Procedure: Irrigation and debridement Left tibia hardware removal    Consult: ov 10/16    RECORDS REQUESTED FROM:        Primary Care Provider: Aleyda Briggs MD- [facility]

## 2021-11-23 NOTE — TELEPHONE ENCOUNTER
Patient's  called in requesting a letter for leave from work for 2 weeks. I&D on 12/8/21 with follow up on 12/22/21  Would like letter written and sent to Ru Dixon

## 2021-12-02 ENCOUNTER — OFFICE VISIT (OUTPATIENT)
Dept: SURGERY | Facility: CLINIC | Age: 41
End: 2021-12-02
Payer: COMMERCIAL

## 2021-12-02 ENCOUNTER — PRE VISIT (OUTPATIENT)
Dept: SURGERY | Facility: CLINIC | Age: 41
End: 2021-12-02

## 2021-12-02 ENCOUNTER — ANESTHESIA EVENT (OUTPATIENT)
Dept: SURGERY | Facility: AMBULATORY SURGERY CENTER | Age: 41
End: 2021-12-02
Payer: COMMERCIAL

## 2021-12-02 ENCOUNTER — LAB (OUTPATIENT)
Dept: LAB | Facility: CLINIC | Age: 41
End: 2021-12-02
Payer: COMMERCIAL

## 2021-12-02 VITALS
HEART RATE: 85 BPM | RESPIRATION RATE: 16 BRPM | SYSTOLIC BLOOD PRESSURE: 145 MMHG | BODY MASS INDEX: 41.98 KG/M2 | OXYGEN SATURATION: 99 % | DIASTOLIC BLOOD PRESSURE: 89 MMHG | TEMPERATURE: 98.4 F | HEIGHT: 68 IN | WEIGHT: 277 LBS

## 2021-12-02 DIAGNOSIS — M25.572 PAIN IN JOINT, ANKLE AND FOOT, LEFT: ICD-10-CM

## 2021-12-02 DIAGNOSIS — R01.1 NEWLY RECOGNIZED HEART MURMUR: ICD-10-CM

## 2021-12-02 DIAGNOSIS — Z01.818 PREOP EXAMINATION: ICD-10-CM

## 2021-12-02 DIAGNOSIS — Z01.818 PREOP EXAMINATION: Primary | ICD-10-CM

## 2021-12-02 LAB
ANION GAP SERPL CALCULATED.3IONS-SCNC: 5 MMOL/L (ref 3–14)
BASOPHILS # BLD AUTO: 0 10E3/UL (ref 0–0.2)
BASOPHILS NFR BLD AUTO: 0 %
BUN SERPL-MCNC: 12 MG/DL (ref 7–30)
CALCIUM SERPL-MCNC: 8.9 MG/DL (ref 8.5–10.1)
CHLORIDE BLD-SCNC: 107 MMOL/L (ref 94–109)
CO2 SERPL-SCNC: 28 MMOL/L (ref 20–32)
CREAT SERPL-MCNC: 0.71 MG/DL (ref 0.52–1.04)
EOSINOPHIL # BLD AUTO: 0.1 10E3/UL (ref 0–0.7)
EOSINOPHIL NFR BLD AUTO: 2 %
ERYTHROCYTE [DISTWIDTH] IN BLOOD BY AUTOMATED COUNT: 17 % (ref 10–15)
GFR SERPL CREATININE-BSD FRML MDRD: >90 ML/MIN/1.73M2
GLUCOSE BLD-MCNC: 110 MG/DL (ref 70–99)
HCT VFR BLD AUTO: 34.7 % (ref 35–47)
HGB BLD-MCNC: 11.3 G/DL (ref 11.7–15.7)
IMM GRANULOCYTES # BLD: 0 10E3/UL
IMM GRANULOCYTES NFR BLD: 1 %
LYMPHOCYTES # BLD AUTO: 2 10E3/UL (ref 0.8–5.3)
LYMPHOCYTES NFR BLD AUTO: 33 %
MCH RBC QN AUTO: 24.6 PG (ref 26.5–33)
MCHC RBC AUTO-ENTMCNC: 32.6 G/DL (ref 31.5–36.5)
MCV RBC AUTO: 75 FL (ref 78–100)
MONOCYTES # BLD AUTO: 0.5 10E3/UL (ref 0–1.3)
MONOCYTES NFR BLD AUTO: 8 %
NEUTROPHILS # BLD AUTO: 3.4 10E3/UL (ref 1.6–8.3)
NEUTROPHILS NFR BLD AUTO: 56 %
NRBC # BLD AUTO: 0 10E3/UL
NRBC BLD AUTO-RTO: 0 /100
PLATELET # BLD AUTO: 236 10E3/UL (ref 150–450)
POTASSIUM BLD-SCNC: 3.6 MMOL/L (ref 3.4–5.3)
RBC # BLD AUTO: 4.6 10E6/UL (ref 3.8–5.2)
SODIUM SERPL-SCNC: 140 MMOL/L (ref 133–144)
WBC # BLD AUTO: 6 10E3/UL (ref 4–11)

## 2021-12-02 PROCEDURE — 80048 BASIC METABOLIC PNL TOTAL CA: CPT | Performed by: PATHOLOGY

## 2021-12-02 PROCEDURE — 99203 OFFICE O/P NEW LOW 30 MIN: CPT | Performed by: PHYSICIAN ASSISTANT

## 2021-12-02 PROCEDURE — 85025 COMPLETE CBC W/AUTO DIFF WBC: CPT | Performed by: PATHOLOGY

## 2021-12-02 PROCEDURE — 93000 ELECTROCARDIOGRAM COMPLETE: CPT | Performed by: INTERNAL MEDICINE

## 2021-12-02 PROCEDURE — 36415 COLL VENOUS BLD VENIPUNCTURE: CPT | Performed by: PATHOLOGY

## 2021-12-02 ASSESSMENT — PAIN SCALES - GENERAL: PAINLEVEL: NO PAIN (0)

## 2021-12-02 ASSESSMENT — ENCOUNTER SYMPTOMS: SEIZURES: 0

## 2021-12-02 ASSESSMENT — LIFESTYLE VARIABLES: TOBACCO_USE: 0

## 2021-12-02 ASSESSMENT — MIFFLIN-ST. JEOR: SCORE: 1964.96
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
## 2021-12-02 NOTE — PATIENT INSTRUCTIONS
Preparing for Your Surgery      Name:  Agustina Julian   MRN:  [id number]   :  1980   Today's Date:  2021         Arriving for surgery:  Surgery date:  21  Arrival time:  10:20 am    Restrictions due to COVID 19:  One consistent visitor is allowed per patient  No ill visitors  All visitors must wear face mask     parking is available for anyone with mobility limitations or disabilities. (Monday- Friday 7 am- 5 pm)    Please come to:    [facility]  [address] [place] [phone]    Please check in on the 5th floor at the Ambulatory Surgery Center       What can I eat or drink?    -  You may eat and drink normally until 8 hours before surgery. (Until 3:50 am)  -  You may have clear liquids up to 4 hours before surgery. (Until 7:50 am)  Examples of clear liquids:  Water  Clear broth  Juices (apple, white grape, white cranberry  and cider) without pulp  Noncarbonated, powder based beverages  (lemonade and Hill-Aid)  Sodas (Sprite, 7-Up, ginger ale and seltzer)  Coffee or tea (without milk or cream)  Gatorade    --No alcohol for at least 24 hours before surgery    Which medicines can I take?    Hold Aspirin for 7 days before surgery.   Hold Multivitamins for 7 days before surgery.  Hold Supplements for 7 days before surgery.  Hold Ibuprofen (Advil, Motrin) for 1 day before surgery--unless otherwise directed by surgeon.  Hold Naproxen (Aleve) for 4 days before surgery.      -  PLEASE TAKE the following medications the day of surgery   Acetaminophen (Tylenol) if needed    How do I prepare myself?  - Please take 2 showers before surgery using Scrubcare or Hibiclens soap.    Use this soap only from the neck to your toes.     Leave the soap on your skin for one minute--then rinse thoroughly.      You may use your own shampoo and conditioner; no other hair products.   - Please remove all jewelry and body piercings.  - No lotions, deodorants or fragrance.  - No makeup  or fingernail polish.   - Bring your ID and insurance card.    -If you have a Deep Brain Stimulator, a Spinal Cord Stimulator or any implanted Neuro Device you must bring the remote to the Surgery Center         - All patients are required to have a Covid-19 test within 4 days of surgery/procedure.      -Patients will be contacted by the [facility] scheduling team within 1 week of surgery to make an appointment.      - Patients may call the Scheduling team at [phone] if they have not been scheduled within 4 days of  surgery.      ALL PATIENTS ARE REQUIRED TO HAVE A RESPONSIBLE ADULT TO DRIVE AND BE IN ATTENDANCE WITH THEM FOR 24 HOURS FOLLOWING SURGERY       Questions or Concerns:    -For questions regarding the day of surgery please contact the Ambulatory Surgery Center at [phone].    -If you have health changes between today and your surgery please contact your surgeon.     For questions after surgery please call your surgeons office.

## 2021-12-03 ENCOUNTER — HOSPITAL ENCOUNTER (OUTPATIENT)
Dept: CARDIOLOGY | Facility: CLINIC | Age: 41
Discharge: HOME | End: 2021-12-03
Attending: PHYSICIAN ASSISTANT | Admitting: PHYSICIAN ASSISTANT
Payer: COMMERCIAL

## 2021-12-03 LAB — LVEF ECHO: NORMAL

## 2021-12-03 PROCEDURE — 93306 TTE W/DOPPLER COMPLETE: CPT

## 2021-12-03 PROCEDURE — 93306 TTE W/DOPPLER COMPLETE: CPT | Mod: 26 | Performed by: INTERNAL MEDICINE

## 2021-12-04 ENCOUNTER — LAB (OUTPATIENT)
Dept: LAB | Facility: CLINIC | Age: 41
End: 2021-12-04
Attending: ORTHOPAEDIC SURGERY
Payer: COMMERCIAL

## 2021-12-04 DIAGNOSIS — Z11.59 ENCOUNTER FOR SCREENING FOR OTHER VIRAL DISEASES: ICD-10-CM

## 2021-12-04 PROCEDURE — 99000 SPECIMEN HANDLING OFFICE-LAB: CPT | Performed by: PATHOLOGY

## 2021-12-04 PROCEDURE — U0003 INFECTIOUS AGENT DETECTION BY NUCLEIC ACID (DNA OR RNA); SEVERE ACUTE RESPIRATORY SYNDROME CORONAVIRUS 2 (SARS-COV-2) (CORONAVIRUS DISEASE [COVID-19]), AMPLIFIED PROBE TECHNIQUE, MAKING USE OF HIGH THROUGHPUT TECHNOLOGIES AS DESCRIBED BY CMS-2020-01-R: HCPCS | Mod: 90 | Performed by: PATHOLOGY

## 2021-12-04 PROCEDURE — U0005 INFEC AGEN DETEC AMPLI PROBE: HCPCS | Mod: 90 | Performed by: PATHOLOGY

## 2021-12-05 LAB — SARS-COV-2 RNA RESP QL NAA+PROBE: NEGATIVE

## 2021-12-06 ENCOUNTER — TELEPHONE (OUTPATIENT)
Dept: ORTHOPEDICS | Facility: CLINIC | Age: 41
End: 2021-12-06
Payer: COMMERCIAL

## 2021-12-06 NOTE — TELEPHONE ENCOUNTER
I spoke with Agustina's , Frankie, and clarified that the appointments he was questioning have been cancelled. Next follow up will be 12/22 for post op. Frankie has no further questions.     Melissa Gu ATC

## 2021-12-08 ENCOUNTER — HOSPITAL ENCOUNTER (OUTPATIENT)
Facility: AMBULATORY SURGERY CENTER | Age: 41
End: 2021-12-08
Attending: ORTHOPAEDIC SURGERY
Payer: COMMERCIAL

## 2021-12-08 ENCOUNTER — ANESTHESIA (OUTPATIENT)
Dept: SURGERY | Facility: AMBULATORY SURGERY CENTER | Age: 41
End: 2021-12-08
Payer: COMMERCIAL

## 2021-12-08 VITALS
HEIGHT: 68 IN | OXYGEN SATURATION: 98 % | BODY MASS INDEX: 41.98 KG/M2 | SYSTOLIC BLOOD PRESSURE: 140 MMHG | HEART RATE: 65 BPM | WEIGHT: 277 LBS | TEMPERATURE: 97.6 F | RESPIRATION RATE: 16 BRPM | DIASTOLIC BLOOD PRESSURE: 88 MMHG

## 2021-12-08 DIAGNOSIS — M25.572 PAIN IN JOINT, ANKLE AND FOOT, LEFT: Primary | ICD-10-CM

## 2021-12-08 LAB
GRAM STAIN RESULT: ABNORMAL
GRAM STAIN RESULT: ABNORMAL
GRAM STAIN RESULT: NORMAL
GRAM STAIN RESULT: NORMAL
HCG UR QL: NEGATIVE
INTERNAL QC OK POCT: NORMAL

## 2021-12-08 PROCEDURE — 99000 SPECIMEN HANDLING OFFICE-LAB: CPT | Performed by: PATHOLOGY

## 2021-12-08 PROCEDURE — 81025 URINE PREGNANCY TEST: CPT | Performed by: PATHOLOGY

## 2021-12-08 PROCEDURE — 87075 CULTR BACTERIA EXCEPT BLOOD: CPT | Mod: 90 | Performed by: PATHOLOGY

## 2021-12-08 PROCEDURE — 20680 REMOVAL OF IMPLANT DEEP: CPT | Mod: LT

## 2021-12-08 PROCEDURE — 87186 SC STD MICRODIL/AGAR DIL: CPT | Mod: 90 | Performed by: PATHOLOGY

## 2021-12-08 PROCEDURE — 87076 CULTURE ANAEROBE IDENT EACH: CPT | Mod: 90 | Performed by: PATHOLOGY

## 2021-12-08 PROCEDURE — 87070 CULTURE OTHR SPECIMN AEROBIC: CPT | Mod: 90 | Performed by: PATHOLOGY

## 2021-12-08 PROCEDURE — 87176 TISSUE HOMOGENIZATION CULTR: CPT | Mod: 90 | Performed by: PATHOLOGY

## 2021-12-08 PROCEDURE — 87205 SMEAR GRAM STAIN: CPT | Mod: 90 | Performed by: PATHOLOGY

## 2021-12-08 PROCEDURE — 87102 FUNGUS ISOLATION CULTURE: CPT | Mod: 90 | Performed by: PATHOLOGY

## 2021-12-08 PROCEDURE — 20680 REMOVAL OF IMPLANT DEEP: CPT | Mod: LT | Performed by: ORTHOPAEDIC SURGERY

## 2021-12-08 PROCEDURE — 87077 CULTURE AEROBIC IDENTIFY: CPT | Mod: 90 | Performed by: PATHOLOGY

## 2021-12-08 RX ORDER — ACETAMINOPHEN 325 MG/1
975 TABLET ORAL ONCE
Status: DISCONTINUED | OUTPATIENT
Start: 2021-12-08 | End: 2021-12-08 | Stop reason: HOSPADM

## 2021-12-08 RX ORDER — OXYCODONE HYDROCHLORIDE 5 MG/1
5 TABLET ORAL EVERY 4 HOURS PRN
Status: DISCONTINUED | OUTPATIENT
Start: 2021-12-08 | End: 2021-12-09 | Stop reason: HOSPADM

## 2021-12-08 RX ORDER — DEXAMETHASONE SODIUM PHOSPHATE 4 MG/ML
INJECTION, SOLUTION INTRA-ARTICULAR; INTRALESIONAL; INTRAMUSCULAR; INTRAVENOUS; SOFT TISSUE PRN
Status: DISCONTINUED | OUTPATIENT
Start: 2021-12-08 | End: 2021-12-08

## 2021-12-08 RX ORDER — LORAZEPAM 2 MG/ML
.5-1 INJECTION INTRAMUSCULAR
Status: DISCONTINUED | OUTPATIENT
Start: 2021-12-08 | End: 2021-12-08 | Stop reason: HOSPADM

## 2021-12-08 RX ORDER — MEPERIDINE HYDROCHLORIDE 25 MG/ML
12.5 INJECTION INTRAMUSCULAR; INTRAVENOUS; SUBCUTANEOUS
Status: DISCONTINUED | OUTPATIENT
Start: 2021-12-08 | End: 2021-12-09 | Stop reason: HOSPADM

## 2021-12-08 RX ORDER — KETOROLAC TROMETHAMINE 30 MG/ML
INJECTION, SOLUTION INTRAMUSCULAR; INTRAVENOUS PRN
Status: DISCONTINUED | OUTPATIENT
Start: 2021-12-08 | End: 2021-12-08

## 2021-12-08 RX ORDER — CEFAZOLIN SODIUM 2 G/50ML
2 SOLUTION INTRAVENOUS SEE ADMIN INSTRUCTIONS
Status: DISCONTINUED | OUTPATIENT
Start: 2021-12-08 | End: 2021-12-08 | Stop reason: HOSPADM

## 2021-12-08 RX ORDER — KETAMINE HYDROCHLORIDE 10 MG/ML
INJECTION, SOLUTION INTRAMUSCULAR; INTRAVENOUS PRN
Status: DISCONTINUED | OUTPATIENT
Start: 2021-12-08 | End: 2021-12-08

## 2021-12-08 RX ORDER — HYDRALAZINE HYDROCHLORIDE 20 MG/ML
2.5-5 INJECTION INTRAMUSCULAR; INTRAVENOUS EVERY 10 MIN PRN
Status: DISCONTINUED | OUTPATIENT
Start: 2021-12-08 | End: 2021-12-08 | Stop reason: HOSPADM

## 2021-12-08 RX ORDER — LIDOCAINE 40 MG/G
CREAM TOPICAL
Status: DISCONTINUED | OUTPATIENT
Start: 2021-12-08 | End: 2021-12-08 | Stop reason: HOSPADM

## 2021-12-08 RX ORDER — HYDROXYZINE HYDROCHLORIDE 25 MG/1
25 TABLET, FILM COATED ORAL
Status: DISCONTINUED | OUTPATIENT
Start: 2021-12-08 | End: 2021-12-09 | Stop reason: HOSPADM

## 2021-12-08 RX ORDER — LIDOCAINE HYDROCHLORIDE 20 MG/ML
INJECTION, SOLUTION INFILTRATION; PERINEURAL PRN
Status: DISCONTINUED | OUTPATIENT
Start: 2021-12-08 | End: 2021-12-08

## 2021-12-08 RX ORDER — SODIUM CHLORIDE, SODIUM LACTATE, POTASSIUM CHLORIDE, CALCIUM CHLORIDE 600; 310; 30; 20 MG/100ML; MG/100ML; MG/100ML; MG/100ML
INJECTION, SOLUTION INTRAVENOUS CONTINUOUS
Status: DISCONTINUED | OUTPATIENT
Start: 2021-12-08 | End: 2021-12-08 | Stop reason: HOSPADM

## 2021-12-08 RX ORDER — FENTANYL CITRATE 50 UG/ML
25 INJECTION, SOLUTION INTRAMUSCULAR; INTRAVENOUS EVERY 5 MIN PRN
Status: DISCONTINUED | OUTPATIENT
Start: 2021-12-08 | End: 2021-12-08 | Stop reason: HOSPADM

## 2021-12-08 RX ORDER — HYDROMORPHONE HYDROCHLORIDE 1 MG/ML
0.2 INJECTION, SOLUTION INTRAMUSCULAR; INTRAVENOUS; SUBCUTANEOUS EVERY 5 MIN PRN
Status: DISCONTINUED | OUTPATIENT
Start: 2021-12-08 | End: 2021-12-08 | Stop reason: HOSPADM

## 2021-12-08 RX ORDER — HYDROXYZINE HYDROCHLORIDE 25 MG/1
25 TABLET, FILM COATED ORAL EVERY 8 HOURS PRN
Qty: 20 TABLET | Refills: 0 | Status: SHIPPED | OUTPATIENT
Start: 2021-12-08 | End: 2023-05-30

## 2021-12-08 RX ORDER — BUPIVACAINE HYDROCHLORIDE 5 MG/ML
INJECTION, SOLUTION PERINEURAL PRN
Status: DISCONTINUED | OUTPATIENT
Start: 2021-12-08 | End: 2021-12-08 | Stop reason: HOSPADM

## 2021-12-08 RX ORDER — CIPROFLOXACIN 500 MG/1
500 TABLET, FILM COATED ORAL 2 TIMES DAILY
Qty: 28 TABLET | Refills: 0 | Status: SHIPPED | OUTPATIENT
Start: 2021-12-08 | End: 2021-12-13

## 2021-12-08 RX ORDER — ONDANSETRON 4 MG/1
4 TABLET, ORALLY DISINTEGRATING ORAL EVERY 30 MIN PRN
Status: DISCONTINUED | OUTPATIENT
Start: 2021-12-08 | End: 2021-12-09 | Stop reason: HOSPADM

## 2021-12-08 RX ORDER — CEFAZOLIN SODIUM 2 G/50ML
2 SOLUTION INTRAVENOUS
Status: COMPLETED | OUTPATIENT
Start: 2021-12-08 | End: 2021-12-08

## 2021-12-08 RX ORDER — SODIUM CHLORIDE, SODIUM LACTATE, POTASSIUM CHLORIDE, CALCIUM CHLORIDE 600; 310; 30; 20 MG/100ML; MG/100ML; MG/100ML; MG/100ML
INJECTION, SOLUTION INTRAVENOUS CONTINUOUS
Status: DISCONTINUED | OUTPATIENT
Start: 2021-12-08 | End: 2021-12-09 | Stop reason: HOSPADM

## 2021-12-08 RX ORDER — ALBUTEROL SULFATE 0.83 MG/ML
2.5 SOLUTION RESPIRATORY (INHALATION) EVERY 4 HOURS PRN
Status: DISCONTINUED | OUTPATIENT
Start: 2021-12-08 | End: 2021-12-08 | Stop reason: HOSPADM

## 2021-12-08 RX ORDER — PROPOFOL 10 MG/ML
INJECTION, EMULSION INTRAVENOUS PRN
Status: DISCONTINUED | OUTPATIENT
Start: 2021-12-08 | End: 2021-12-08

## 2021-12-08 RX ORDER — PROPOFOL 10 MG/ML
INJECTION, EMULSION INTRAVENOUS CONTINUOUS PRN
Status: DISCONTINUED | OUTPATIENT
Start: 2021-12-08 | End: 2021-12-08

## 2021-12-08 RX ORDER — KETOROLAC TROMETHAMINE 30 MG/ML
15 INJECTION, SOLUTION INTRAMUSCULAR; INTRAVENOUS EVERY 6 HOURS PRN
Status: DISCONTINUED | OUTPATIENT
Start: 2021-12-08 | End: 2021-12-09 | Stop reason: HOSPADM

## 2021-12-08 RX ORDER — OXYCODONE HYDROCHLORIDE 5 MG/1
5-10 TABLET ORAL EVERY 4 HOURS PRN
Qty: 12 TABLET | Refills: 0 | Status: SHIPPED | OUTPATIENT
Start: 2021-12-08 | End: 2023-05-30

## 2021-12-08 RX ORDER — FENTANYL CITRATE 50 UG/ML
25 INJECTION, SOLUTION INTRAMUSCULAR; INTRAVENOUS
Status: DISCONTINUED | OUTPATIENT
Start: 2021-12-08 | End: 2021-12-09 | Stop reason: HOSPADM

## 2021-12-08 RX ORDER — ONDANSETRON 2 MG/ML
INJECTION INTRAMUSCULAR; INTRAVENOUS PRN
Status: DISCONTINUED | OUTPATIENT
Start: 2021-12-08 | End: 2021-12-08

## 2021-12-08 RX ORDER — ONDANSETRON 2 MG/ML
4 INJECTION INTRAMUSCULAR; INTRAVENOUS EVERY 30 MIN PRN
Status: DISCONTINUED | OUTPATIENT
Start: 2021-12-08 | End: 2021-12-09 | Stop reason: HOSPADM

## 2021-12-08 RX ORDER — OXYCODONE HYDROCHLORIDE 5 MG/1
5 TABLET ORAL
Status: DISCONTINUED | OUTPATIENT
Start: 2021-12-08 | End: 2021-12-09 | Stop reason: HOSPADM

## 2021-12-08 RX ORDER — LABETALOL HYDROCHLORIDE 5 MG/ML
10 INJECTION, SOLUTION INTRAVENOUS
Status: DISCONTINUED | OUTPATIENT
Start: 2021-12-08 | End: 2021-12-08 | Stop reason: HOSPADM

## 2021-12-08 RX ADMIN — CEFAZOLIN SODIUM 2 G: 2 SOLUTION INTRAVENOUS at 12:04

## 2021-12-08 RX ADMIN — SODIUM CHLORIDE, SODIUM LACTATE, POTASSIUM CHLORIDE, CALCIUM CHLORIDE: 600; 310; 30; 20 INJECTION, SOLUTION INTRAVENOUS at 11:43

## 2021-12-08 RX ADMIN — OXYCODONE HYDROCHLORIDE 5 MG: 5 TABLET ORAL at 13:11

## 2021-12-08 RX ADMIN — LIDOCAINE HYDROCHLORIDE 60 MG: 20 INJECTION, SOLUTION INFILTRATION; PERINEURAL at 11:49

## 2021-12-08 RX ADMIN — PROPOFOL 50 MG: 10 INJECTION, EMULSION INTRAVENOUS at 11:50

## 2021-12-08 RX ADMIN — ONDANSETRON 4 MG: 2 INJECTION INTRAMUSCULAR; INTRAVENOUS at 12:06

## 2021-12-08 RX ADMIN — KETOROLAC TROMETHAMINE 30 MG: 30 INJECTION, SOLUTION INTRAMUSCULAR; INTRAVENOUS at 12:10

## 2021-12-08 RX ADMIN — KETAMINE HYDROCHLORIDE 10 MG: 10 INJECTION, SOLUTION INTRAMUSCULAR; INTRAVENOUS at 12:01

## 2021-12-08 RX ADMIN — PROPOFOL 200 MCG/KG/MIN: 10 INJECTION, EMULSION INTRAVENOUS at 12:12

## 2021-12-08 RX ADMIN — KETAMINE HYDROCHLORIDE 20 MG: 10 INJECTION, SOLUTION INTRAMUSCULAR; INTRAVENOUS at 11:51

## 2021-12-08 RX ADMIN — DEXAMETHASONE SODIUM PHOSPHATE 4 MG: 4 INJECTION, SOLUTION INTRA-ARTICULAR; INTRALESIONAL; INTRAMUSCULAR; INTRAVENOUS; SOFT TISSUE at 12:06

## 2021-12-08 RX ADMIN — PROPOFOL 100 MG: 10 INJECTION, EMULSION INTRAVENOUS at 11:53

## 2021-12-08 RX ADMIN — PROPOFOL 150 MG: 10 INJECTION, EMULSION INTRAVENOUS at 11:49

## 2021-12-08 RX ADMIN — PROPOFOL 200 MCG/KG/MIN: 10 INJECTION, EMULSION INTRAVENOUS at 11:49

## 2021-12-08 RX ADMIN — Medication 0.5 MG: at 12:10

## 2021-12-08 ASSESSMENT — MIFFLIN-ST. JEOR: SCORE: 1964.96

## 2021-12-08 NOTE — DISCHARGE INSTRUCTIONS
[facility]  Home Care Following Anesthesia  For 24 hours after surgery:  1. Get plenty of rest.  A responsible adult must stay with you for at least 24 hours after you leave the surgery center.  2. Do not drive or use heavy equipment.  If you have weakness or tingling, don't drive or use heavy equipment until this feeling goes away.   3. Do not drink alcohol.   4. Avoid strenuous or risky activities.  Ask for help when climbing stairs.  5. You may feel lightheaded.  IF so, sit for a few minutes before standing.  Have someone help you get up.   6. If you have nausea (feel sick to your stomach): Drink only clear liquids such as apple juice, ginger ale, broth or 7-Up.  Rest may also help.  Be sure to drink enough fluids.  Move to a regular diet as you feel able.   7. You may have a slight fever.  Call the doctor if your fever is over 100 F (37.7 C) (taken under the tongue) or lasts longer than 24 hours.  8. You may have a dry mouth, a sore throat, muscle aches or trouble sleeping. These should go away after 24 hours.  9. Do not make important or legal decisions.   10. It is recommended to avoid smoking.               Tips for taking pain medications  To get the best pain relief possible, remember these points:    Take pain medications as directed, before pain becomes severe.    Pain medication can upset your stomach: taking it with food may help.    Constipation is a common side effect of pain medication. Drink plenty of  fluids.    Eat foods high in fiber. Take a stool softener if recommended by your doctor or pharmacist.    Do not drink alcohol, drive or operate machinery while taking pain medications.    Ask about other ways to control pain, such as with heat, ice or relaxation.    Tylenol/Acetaminophen Consumption  To help encourage the safe use of acetaminophen, the makers of TYLENOL  have lowered the maximum daily dose for single-ingredient Extra Strength TYLENOL   (acetaminophen) products sold in the [place] from 8 pills per day (4,000 mg) to 6 pills per day (3,000 mg). The dosing interval has also changed from 2 pills every 4-6 hours to 2 pills every 6 hours.    If you feel your pain relief is insufficient, you may take Tylenol/Acetaminophen in addition to your narcotic pain medication.     Be careful not to exceed 3,000 mg of Tylenol/Acetaminophen in a 24 hour period from all sources.    If you are taking extra strength Tylenol/acetaminophen (500 mg), the maximum dose is 6 tablets in 24 hours.    If you are taking regular strength acetaminophen (325 mg), the maximum dose is 9 tablets in 24 hours.    Call a doctor for any of the followin. Signs of infection (fever, growing tenderness at the surgery site, a large amount of drainage or bleeding, severe pain, foul-smelling drainage, redness, swelling).  2. It has been over 8 to 10 hours since surgery and you are still not able to urinate (pass water).  3. Headache for over 24 hours.  4. Signs of Covid-19 infection (temperature over 100 degrees, shortness of breath, cough, loss of taste/smell, generalized body aches, persistent headache, chills, sore throat, nausea/vomiting/diarrhea)    Your doctor is:       Dr. Stiven Santiago, Orthopaedics: [phone]               After Hours and Weekends dial: [phone] and ask for the resident on call for:  Orthopaedics  For emergency care, call the:  [facility] Emergency Department: [phone] (TTY for hearing impaired: [phone])

## 2021-12-08 NOTE — ANESTHESIA POSTPROCEDURE EVALUATION
Patient: Agustina Julian    Procedure: Procedure(s):  Irrigation and debridement Left tibia  hardware removal       Diagnosis:Pain in joint, ankle and foot, left [M25.572]  Diagnosis Additional Information: No value filed.    Anesthesia Type:  General    Note:  Disposition: Outpatient   Postop Pain Control: Uneventful            Sign Out: Well controlled pain   PONV: No   Neuro/Psych: Uneventful            Sign Out: Acceptable/Baseline neuro status   Airway/Respiratory: Uneventful            Sign Out: Acceptable/Baseline resp. status   CV/Hemodynamics: Uneventful            Sign Out: Acceptable CV status; No obvious hypovolemia; No obvious fluid overload   Other NRE: NONE   DID A NON-ROUTINE EVENT OCCUR? No           Last vitals:  Vitals Value Taken Time   /59 12/08/21 1307   Temp 36.1  C (97  F) 12/08/21 1307   Pulse 65 12/08/21 1307   Resp 16 12/08/21 1307   SpO2 99 % 12/08/21 1307       Electronically Signed By: Janett Burks MD  December 8, 2021  2:10 PM

## 2021-12-08 NOTE — BRIEF OP NOTE
[facility] [place]    Brief Operative Note    Pre-operative diagnosis: Pain in joint, ankle and foot, left [M25.572]  Post-operative diagnosis Same as pre-operative diagnosis    Procedure: Procedure(s):  Irrigation and debridement Left tibia  hardware removal  Surgeon: Surgeon(s) and Role:     * Stiven Santiago MD - Primary     * Anna Harris PA-C - Assisting  Anesthesia: Choice   Estimated Blood Loss: 20ml    Drains: None  Specimens: * No specimens in log *  Findings:   None.  Complications: None.  Implants: * No implants in log *    Plan:  Same Day surgery discharge to home once criteria met.  CAM boot to remain on left  lower extremity and WBAT.  Oxycodone and atarax for pain.  Cipro for post op antibiotics x 14 days   No dressing change on own.  Leave dressing on until 2 weeks follow up appointment.  F/U in clinic in 2 weeks    I was asked by Dr. Santiago to assist with surgery. I positioned and prepped the patient. I retracted soft tissue.   I suctioned fluids when needed. I provided traction for dissection. I helped to ligate blood vessels. I helped Dr. Santiago identify and protect important structures. The procedure was medically necessary for an assistant because Dr. Santiago needed the operative exposure and assistance that I provided. This allowed him to safely and efficiently operate. It was also important that I help ligate blood vessels to maintain hemostasis and reduce the bleeding risk. I helped with the closure of the operative incisions as well as helping with the boot/cast/splint.  The assistance that I provided reduced operative time which meant less general anesthetic for the patient. No qualified residents were available to assist.    Anna Harris PA-C

## 2021-12-08 NOTE — ANESTHESIA CARE TRANSFER NOTE
Patient: Agustina Julian    Procedure: Procedure(s):  Irrigation and debridement Left tibia  hardware removal       Diagnosis: Pain in joint, ankle and foot, left [M25.572]  Diagnosis Additional Information: No value filed.    Anesthesia Type:   General     Note:    Oropharynx: spontaneously breathing  Level of Consciousness: drowsy  Oxygen Supplementation: room air    Independent Airway: airway patency satisfactory and stable  Dentition: dentition unchanged  Vital Signs Stable: post-procedure vital signs reviewed and stable  Report to RN Given: handoff report given  Patient transferred to: PACU    Handoff Report: Identifed the Patient, Identified the Reponsible Provider, Reviewed the pertinent medical history, Discussed the surgical course, Reviewed Intra-OP anesthesia mangement and issues during anesthesia, Set expectations for post-procedure period and Allowed opportunity for questions and acknowledgement of understanding      Vitals:  Vitals Value Taken Time   /64 12/08/21 1248   Temp 36.2  C (97.2  F) 12/08/21 1248   Pulse 70 12/08/21 1253   Resp 11 12/08/21 1253   SpO2 98 % 12/08/21 1253   Vitals shown include unvalidated device data.    Electronically Signed By: JESUS ALBERTO Baird CRNA  December 8, 2021  12:54 PM

## 2021-12-09 ENCOUNTER — TELEPHONE (OUTPATIENT)
Dept: ORTHOPEDICS | Facility: CLINIC | Age: 41
End: 2021-12-09
Payer: COMMERCIAL

## 2021-12-09 NOTE — OP NOTE
Procedure Date: 12/08/2021    PREOPERATIVE DIAGNOSIS:  Left tibia infection.    POSTOPERATIVE DIAGNOSIS:  Left tibia infection.    PROCEDURE:    1.  Left tibia hardware removal.  2.  Left tibia irrigation and debridement.    SURGEON:  Stiven Santiago MD    ASSISTANT:  Anna Harris PA-C  Ms. Harris's assistance was required in order to provide assistance with positioning, the surgery itself, holding retractors, closure of the wound and application of immobilization devices.  At the time of the surgery, there was no available help from an orthopedic trainee.    COMPLICATIONS:  None.    DRAINS:  None.    ESTIMATED BLOOD LOSS:  Less than 20 mL    ANESTHESIA:  General endotracheal.    PATHOLOGY: Routine cultures for aerobic, anaerobic, Gram stain and fungal from the left tibia.    INDICATIONS FOR PROCEDURE:  Please refer to clinic note for further details discussing the indication in Ms. Julian' case.    DESCRIPTION OF PROCEDURE:  On 12/08/2021, patient was taken to surgery.  Preoperative antibiotics were held up to the inflation of the tourniquet.  After successful induction of general endotracheal anesthesia, she was placed supine on the operating table.  The left lower extremity was prepped and draped in sterile fashion.  After exsanguination by gravity, tourniquet cuff was inflated to 300 mmHg on the proximal third of the left thigh.    The pause for the cause was performed according to institution's policy which confirmed laterality of the procedure.    We proceeded with an incision on the anterior portion of the tibia where she presented with skin breakdown.  Subcutaneous tissues were dissected.  We were able to identify some tracking into the plate on the lateral aspect of the tibia.  Some very mild purulent material could be visualized as well.  We proceeded with extraction of the plate and the 6 screws without any difficulties.  We proceeded then with an aggressive debridement of the tibia with a curet.   Excisional debridement was performed with #10 surgical blade which was extended all the way down into of the left lower leg.  We did not identify any other pockets or tunnelization.    A total of 2 liters of normal saline were irrigated and we proceeded with closure of the wound with a Prolene suture without any deep stitches.    Tourniquet cuff was deflated.  Satisfactory hemostasis was accomplished.  Sterile dressings were applied.  The patient was placed in a tall Cam walker and transferred in stable condition to PACU.    PLAN:  The patient was discharged on ciprofloxacin antibiotic prophylactically.  She will proceed with weightbearing as tolerated and the use of the CAM walker at all times except for hygiene.    The patient will be contacted via phone with regards to the culture results and based on those results, further adjustments of her antibiotic therapy will be performed. Most likely the patient will require PICC line as well.    The patient will be reevaluated at 2 weeks from surgery for suture removal, which will be followed by a 6-week appointment.  At that time, no x-rays will be obtained.    The use of the CAM walker will be done as long as the wound is not healed.  Once the patient accomplishes complete healing of the wound, the CAM walker will be used for safety, but it can be used for comfort.    The patient is not expected to require any physical therapy during the postoperative course.  If that is her desire to be performed, it will be done without any restrictions; however, the patient will not pursue any physical therapy until the wound is completely healed.      Stiven Santiago MD        D: 2021   T: 2021   MT: PAKMT    Name:     CORINA ANTONIO  MRN:      [id number]        Account:        [id number]   :      1980           Procedure Date: 2021     Document: [id number]

## 2021-12-10 ENCOUNTER — TELEPHONE (OUTPATIENT)
Dept: INFECTIOUS DISEASES | Facility: CLINIC | Age: 41
End: 2021-12-10

## 2021-12-10 ENCOUNTER — OFFICE VISIT (OUTPATIENT)
Dept: ORTHOPEDICS | Facility: CLINIC | Age: 41
End: 2021-12-10
Payer: COMMERCIAL

## 2021-12-10 DIAGNOSIS — B99.9 INFECTION: Primary | ICD-10-CM

## 2021-12-10 DIAGNOSIS — Z98.890 STATUS POST SURGERY: Primary | ICD-10-CM

## 2021-12-10 PROCEDURE — 99024 POSTOP FOLLOW-UP VISIT: CPT

## 2021-12-10 NOTE — TELEPHONE ENCOUNTER
[facility] Call Center    Phone Message    May a detailed message be left on voicemail: yes     Reason for Call: Appointment Intake    Referring Provider Name: Dr. Stiven Santiago from [facility]  Diagnosis and/or Symptoms: Infection, Gram negative bacilli, Klebsiella aerogenes, Staph aereus, proteus mirabilis    This is an urgent referral, pt needs to be seen within 3-5 days.      I did not have this to schedule, please review for availability.    Action Taken: Message routed to:  [facility] ([facility]): ID    Travel Screening: Not Applicable

## 2021-12-10 NOTE — TELEPHONE ENCOUNTER
Orthopedic progress update    Patient is a 41-year-old female who is status post irrigation and debridement Left tibia  hardware removal on 12/8/2021 with Dr. Gallegos.    Patient's  called orthopedic on call today.  Patient's  noted that the dressing is saturated.  According to the patient there is no profuse bleeding there is only strikethrough at the dressing.  Otherwise no other changes.  No new trauma.    Plan:  I discussed with the patient's  that the patient should elevate her extremity to decrease swelling.  Additionally they can reinforce the dressing with gauze.    Asked the patient to call Dr. Santiago's office tomorrow morning for an update and if the dressing needs to be changed in the office.    Lastly I discussed with the patient's  that if her dressing continues to be saturated or continued bleeding is noted then she should get the wound evaluated at the ED.    Kunal Holt MD  Orthopaedic Surgery Resident, PGY4

## 2021-12-10 NOTE — PROGRESS NOTES
Reason for visit:    Agustina Julian came in to the clinic for a post op dressing change.    Her surgery was done 12/09/2021 by Dr Santiago.  She had a Left tibia hardware removal and tibia irrigation and debridement.     Assessment:    Agustina came into the clinic in a CAM walker WBAT, accompanied by her .    Agustina is heavily bleeding through her post op dressings. She is here today per my suggestion for a dressing change. Dressing were removed and disposed of in hazardous waste. The Surgical wounds were exposed and found to be not sufficiently healed; so the sutures were not removed. There appears to be no active bleeding. Her incision was re dressed with Xeroform, sterile gauze, and two abdominal pads. Secured with a new ACE bandage.  I sent her home with additional dressing materials should it bleed through again she and her  can reinforce or change them.  Per Dr. Santiago, I have placed an urgent Infectious Disease referral. They should be reaching out to her very soon.    Plan:     She was placed in her CAM walker.  She was told to remain WBAT and elevate when possible to reduce pressure and further bleeding.     She has an appointment to see me at two weeks post op for a wound check. She will then see Dr. Santiago at 6 weeks post op and Dr. Santiago will determine further restrictions.     She has our phone number and will call with questions or problems.      Melissa Gu ATC

## 2021-12-11 LAB
BACTERIA TISS BX CULT: ABNORMAL

## 2021-12-13 ENCOUNTER — PRE VISIT (OUTPATIENT)
Dept: INFECTIOUS DISEASES | Facility: CLINIC | Age: 41
End: 2021-12-13
Payer: COMMERCIAL

## 2021-12-13 ENCOUNTER — VIRTUAL VISIT (OUTPATIENT)
Dept: INFECTIOUS DISEASES | Facility: CLINIC | Age: 41
End: 2021-12-13
Attending: STUDENT IN AN ORGANIZED HEALTH CARE EDUCATION/TRAINING PROGRAM
Payer: COMMERCIAL

## 2021-12-13 DIAGNOSIS — Z79.2 ENCOUNTER FOR LONG-TERM (CURRENT) USE OF ANTIBIOTICS: ICD-10-CM

## 2021-12-13 DIAGNOSIS — I10 ESSENTIAL HYPERTENSION: ICD-10-CM

## 2021-12-13 DIAGNOSIS — Z23 NEED FOR VACCINATION: ICD-10-CM

## 2021-12-13 DIAGNOSIS — M86.462 CHRONIC OSTEOMYELITIS OF LEFT TIBIA WITH DRAINING SINUS (H): Primary | ICD-10-CM

## 2021-12-13 PROCEDURE — G0463 HOSPITAL OUTPT CLINIC VISIT: HCPCS | Mod: PN,RTG | Performed by: STUDENT IN AN ORGANIZED HEALTH CARE EDUCATION/TRAINING PROGRAM

## 2021-12-13 PROCEDURE — 99205 OFFICE O/P NEW HI 60 MIN: CPT | Mod: 95 | Performed by: STUDENT IN AN ORGANIZED HEALTH CARE EDUCATION/TRAINING PROGRAM

## 2021-12-13 RX ORDER — CIPROFLOXACIN 500 MG/1
500 TABLET, FILM COATED ORAL 2 TIMES DAILY
Qty: 66 TABLET | Refills: 0 | Status: SHIPPED | OUTPATIENT
Start: 2021-12-13 | End: 2023-05-30

## 2021-12-13 ASSESSMENT — PAIN SCALES - GENERAL: PAINLEVEL: SEVERE PAIN (6)

## 2021-12-13 NOTE — TELEPHONE ENCOUNTER
RECORDS RECEIVED FROM:    DATE RECEIVED: 12.13.2021   NOTES (Gather within 2 years) STATUS DETAILS   OFFICE NOTE from referring provider   Internal 12.10.2021 Stiven Santiago MD   OFFICE NOTE from other specialist N/A    DISCHARGE SUMMARY from hospital N/A    DISCHARGE REPORT from the ER N/A    LABS (any labs) Internal    MEDICATION LIST Internal    IMAGING  (NEED IMAGES AND REPORTS)     Osteomyelitis: Foot imaging  N/A    Liver Abscess: Abdominal imaging N/A    Other (anything related to diagnoses N/A
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
## 2021-12-15 LAB
BACTERIA TISS BX CULT: ABNORMAL

## 2021-12-22 ENCOUNTER — OFFICE VISIT (OUTPATIENT)
Dept: ORTHOPEDICS | Facility: CLINIC | Age: 41
End: 2021-12-22
Payer: COMMERCIAL

## 2021-12-22 DIAGNOSIS — Z98.890 STATUS POST SURGERY: Primary | ICD-10-CM

## 2021-12-22 PROCEDURE — 99024 POSTOP FOLLOW-UP VISIT: CPT

## 2021-12-22 NOTE — PROGRESS NOTES
Reason for visit:    Agustina Julian came in to the clinic for a two week post op check.    Her surgery was done 12/9/21 by Dr Santiago.  She had a Left tibia hardware removal and left tibia irrigation and debridement.     Assessment:    Agustina came into the clinic in her CAM walker WBAT, accompanied by her .    The Surgical wounds were exposed and found to be improved; so the proximal 3-4 sutures were removed. The distal end of the wound was still open but not bleeding or draining. It appeared to be healing well.    Plan:     She was placed into her CAM walker.  She was told to continue wearing it and to  WBAT. She will now head downstairs to the pharmacy to get her dTap vaccination.    She will come back next week to remove the remainder of the sutures.     She has an appointment to see Dr. Santiago at 6 weeks post op and at that time Dr. Santiago will determine further restrictions.    She has our phone number and will call with questions or problems.    Melissa Gu ATC

## 2021-12-28 ENCOUNTER — OFFICE VISIT (OUTPATIENT)
Dept: ORTHOPEDICS | Facility: CLINIC | Age: 41
End: 2021-12-28
Payer: COMMERCIAL

## 2021-12-28 DIAGNOSIS — Z98.890 STATUS POST SURGERY: Primary | ICD-10-CM

## 2021-12-28 PROCEDURE — 99024 POSTOP FOLLOW-UP VISIT: CPT

## 2021-12-28 NOTE — PROGRESS NOTES
Reason for visit:    Agustina Julian came in to the clinic for remaining suture removal.      Assessment:    Agustina came into the clinic in a CAM boot WBAT    The Surgical wounds were exposed and found to be improved but not sufficiently healed; so the sutures were not removed. Hemalatha Yaya observed the wound and recommeded Agustina do daily dressing changes and continue to leave these sutures in for an additional week. The wound is still gapping but not draining. Hemalatha encouraged elevation to decrease stress on the incision. Otherwise, all is looking good with the incision. Agustina reports no pain.    Plan:     She was placed into her CAM walker with clean dressings on the wound.  She was told to continue to WBAT and elevate the leg often. She was sent home with supplies for dressing changes and a work letter.   She will come back in 10 days to re evaluate for suture removal.     She has an appointment to see Dr. Santiago at 6 weeks post op and at that time Dr. Santiago will determine further restrictions.    She has our phone number and will call with questions or problems.    Melissa Gu ATC

## 2021-12-28 NOTE — LETTER
Return to Work  2021     Seen today: yes    Patient:  Agustina Julian  :   1980  MRN:     [id number]  Physician: RENE BENTON ORTHO NURSE    Agustina Julian may return to work on Date: 21.      The next clinic appointment is scheduled for (date/time) 21.    Electronically signed by Ortho Nurse  Melissa Gu ATC

## 2021-12-31 NOTE — PROGRESS NOTES
Discharge Note    Progress reporting period is from last progress note on   to Oct 28, 2021.    Agustina failed to follow up and current status is unknown.  Please see information below for last relevant information on current status.  Patient seen for 2 visits.    SUBJECTIVE  Subjective changes noted by patient:  Pt reports ankle is feeling better, she completes exercises 2x a day. Pain is mostly around Achilles. It gets stiff and painful with sitting for 20 minutes.   .  Current pain level is 7/10.     Previous pain level was  9/10.   Changes in function:  Yes (See Goal flowsheet attached for changes in current functional level)  Adverse reaction to treatment or activity: None    OBJECTIVE  Changes noted in objective findings: Pt holds SL stance on R for 30 sec, L for 5 sec. Still has Unna boot on limiting ankle ROM.      ASSESSMENT/PLAN  Diagnosis: L ankle pain s/p tib/fib osteotomy   Updated problem list and treatment plan:   Pain - HEP  Decreased ROM/flexibility - HEP  Decreased function - HEP  Decreased strength - HEP  Impaired gait - HEP  STG/LTGs have been met or progress has been made towards goals:  Yes, please see goal flowsheet for most current information  Assessment of Progress: current status is unknown.    Last current status: Pt is progressing as expected   Self Management Plans:  HEP  I have re-evaluated this patient and find that the nature, scope, duration and intensity of the therapy is appropriate for the medical condition of the patient.  Agustina continues to require the following intervention to meet STG and LTG's:  HEP.    Recommendations:  Discharge with current home program.  Patient to follow up with MD as needed.    Please refer to the daily flowsheet for treatment today, total treatment time and time spent performing 1:1 timed codes.

## 2022-01-06 ENCOUNTER — OFFICE VISIT (OUTPATIENT)
Dept: ORTHOPEDICS | Facility: CLINIC | Age: 42
End: 2022-01-06
Payer: COMMERCIAL

## 2022-01-06 DIAGNOSIS — Z98.890 STATUS POST SURGERY: Primary | ICD-10-CM

## 2022-01-06 PROCEDURE — 99024 POSTOP FOLLOW-UP VISIT: CPT

## 2022-01-06 NOTE — PROGRESS NOTES
Reason for visit:    Agustina Julian came in to the clinic for a post op incision check.    Her surgery was done 12/9/21 by Dr Santiago.  She had a left tibia hardware removal and left tibia irrigation and debridement.     Assessment:    Agustina came into the clinic in a CAM walker WBAT, accompanied by her .    The Surgical wounds were exposed and found to be improved; so the final sutures were removed. The wound is looking much better than last week. Skin is c/d/i. Some minor bleeding from the suture holes occurred briefly. This was covererd with a Telfa bandage. Agustina denies pain.    Plan:     She was placed into her CAM walker but told she may transition out of it into her normal shoe.  She was told to continue WBAT. She may get the incision wet for hygiene, just avoid submerging it.     She has an appointment to see Dr. Santiago on 1/18/21 and at that time Dr. Santiago will determine further restrictions.    She has our phone number and will call with questions or problems.    Melissa Gu ATC

## 2022-01-18 ENCOUNTER — OFFICE VISIT (OUTPATIENT)
Dept: ORTHOPEDICS | Facility: CLINIC | Age: 42
End: 2022-01-18
Payer: COMMERCIAL

## 2022-01-18 DIAGNOSIS — M25.572 PAIN IN JOINT, ANKLE AND FOOT, LEFT: Primary | ICD-10-CM

## 2022-01-18 PROCEDURE — 99024 POSTOP FOLLOW-UP VISIT: CPT | Performed by: ORTHOPAEDIC SURGERY

## 2022-01-18 NOTE — LETTER
Date:January 19, 2022      Patient was self referred, no letter generated. Do not send.        [facility] Health Information

## 2022-01-18 NOTE — PROGRESS NOTES
CHIEF COMPLAINT:  Status post left tibia I&D with hardware removal performed on 12/08/2021.      HISTORY OF PRESENT ILLNESS:  Mrs. Julian presents today for further followup in the company of her .  She reports to be doing well.  She reports to be feeling significantly better than prior to surgery.  She denies to have any pain except for some along the Achilles tendon region.    PHYSICAL EXAMINATION:  She presents with almost complete healing of the surgical incision.  There is excellent granulation tissue.  There is no active drainage.  The patient presents with full range of motion of the left ankle and some discomfort with palpation of the Achilles tendon.    ASSESSMENT:  Status post left tibia I&D with hardware removal.    PLAN:  I discussed with the patient and her  that at this point, I am very pleased to hear that she is feeling this good.  We will proceed with a course of Physical Therapy to work on her Achilles tendon.    The patient continues to be managed by ID with regard to her infection, and I am very grateful for that.    She will follow up on a p.r.n. basis.  All questions were answered.

## 2022-01-18 NOTE — LETTER
1/18/2022         RE: Agustina Julian  [address]  [place] [address]        Dear Colleague,    Thank you for referring your patient, Agustina Julian, to the [facility] [place]. Please see a copy of my visit note below.    CHIEF COMPLAINT:  Status post left tibia I&D with hardware removal performed on 12/08/2021.      HISTORY OF PRESENT ILLNESS:  Mrs. Julian presents today for further followup in the company of her .  She reports to be doing well.  She reports to be feeling significantly better than prior to surgery.  She denies to have any pain except for some along the Achilles tendon region.    PHYSICAL EXAMINATION:  She presents with almost complete healing of the surgical incision.  There is excellent granulation tissue.  There is no active drainage.  The patient presents with full range of motion of the left ankle and some discomfort with palpation of the Achilles tendon.    ASSESSMENT:  Status post left tibia I&D with hardware removal.    PLAN:  I discussed with the patient and her  that at this point, I am very pleased to hear that she is feeling this good.  We will proceed with a course of Physical Therapy to work on her Achilles tendon.    The patient continues to be managed by ID with regard to her infection, and I am very grateful for that.    She will follow up on a p.r.n. basis.  All questions were answered.          Again, thank you for allowing me to participate in the care of your patient.        Sincerely,        Stiven Santiago MD

## 2022-01-18 NOTE — NURSING NOTE
Reason For Visit:   Chief Complaint   Patient presents with     RECHECK     Left tibia irrigation and debridement and hardware removal DOS 12/8/21       There were no vitals taken for this visit.    Pain Assessment  Patient Currently in Pain: Denies    Melissa Gu, ATC     Admission

## 2022-02-03 ENCOUNTER — VIRTUAL VISIT (OUTPATIENT)
Dept: INFECTIOUS DISEASES | Facility: CLINIC | Age: 42
End: 2022-02-03
Attending: STUDENT IN AN ORGANIZED HEALTH CARE EDUCATION/TRAINING PROGRAM
Payer: COMMERCIAL

## 2022-02-03 DIAGNOSIS — M86.462 CHRONIC OSTEOMYELITIS OF LEFT TIBIA WITH DRAINING SINUS (H): Primary | ICD-10-CM

## 2022-02-03 PROCEDURE — G0463 HOSPITAL OUTPT CLINIC VISIT: HCPCS | Mod: PN,RTG | Performed by: STUDENT IN AN ORGANIZED HEALTH CARE EDUCATION/TRAINING PROGRAM

## 2022-02-03 PROCEDURE — 99214 OFFICE O/P EST MOD 30 MIN: CPT | Mod: 95 | Performed by: STUDENT IN AN ORGANIZED HEALTH CARE EDUCATION/TRAINING PROGRAM

## 2022-02-03 NOTE — LETTER
Date:February 4, 2022      Patient was self referred, no letter generated. Do not send.        [facility] Health Information
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
## 2022-02-23 ENCOUNTER — TELEPHONE (OUTPATIENT)
Dept: INFECTIOUS DISEASES | Facility: CLINIC | Age: 42
End: 2022-02-23
Payer: COMMERCIAL

## 2022-03-01 ENCOUNTER — VIRTUAL VISIT (OUTPATIENT)
Dept: FAMILY MEDICINE | Facility: CLINIC | Age: 42
End: 2022-03-01
Payer: COMMERCIAL

## 2022-03-01 VITALS — DIASTOLIC BLOOD PRESSURE: 84 MMHG | SYSTOLIC BLOOD PRESSURE: 138 MMHG

## 2022-03-01 DIAGNOSIS — I10 ESSENTIAL HYPERTENSION: ICD-10-CM

## 2022-03-01 DIAGNOSIS — E66.01 MORBID OBESITY (H): ICD-10-CM

## 2022-03-01 DIAGNOSIS — H93.90 EAR LESION: Primary | ICD-10-CM

## 2022-03-01 PROCEDURE — 99213 OFFICE O/P EST LOW 20 MIN: CPT | Mod: 95 | Performed by: PREVENTIVE MEDICINE

## 2022-03-01 NOTE — PATIENT INSTRUCTIONS
At [facility], we strive to deliver an exceptional experience to you, every time we see you. If you receive a survey, please complete it as we do value your feedback.  If you have MyChart, you can expect to receive results automatically within 24 hours of their completion.  Your provider will send a note interpreting your results as well.   If you do not have MyChart, you should receive your results in about a week by mail.    Your care team:                            Family Medicine Internal Medicine   MD Anish Portillo MD Shantel Branch-Fleming, MD Srinivasa Vaka, MD Katya Belousova, PAZULY Barone, APRN CNP    Michelet Smith, MD Pediatrics   Kaushik Stephen, PAZULY Mccoy, CNP MD Shannan Dupree APRN CNP   MD Cammie Khan MD Deborah Mielke, MD Silke Escobar, APRN [facility]      Clinic hours: Monday - Thursday 7 am-6 pm; Fridays 7 am-5 pm.   Urgent care: Monday - Friday 10 am- 8 pm; Saturday and Sunday 9 am-5 pm.    Clinic: [phone]       [facility]: Monday - Thursday 8 am - 7 pm; Friday 8 am - 6 pm  [facility]: [phone]     Use www.[email or web address].org for 24/7 diagnosis and treatment of dozens of conditions.    THE NUMBER FOR THE SKIN DOCTOR:    [facility]   [address] [address] [place] [id number]   Phone: [phone]   Fax: [phone]

## 2022-03-01 NOTE — PROGRESS NOTES
"Agustina is a 41 year old who is being evaluated via a billable video visit.      How would you like to obtain your AVS? MyChart  If the video visit is dropped, the invitation should be resent by: Text to cell phone: [phone]  Will anyone else be joining your video visit? No      Video Start Time: 2:41 PM    Assessment & Plan     Ear lesion  -Differentials include Keloids, cysts  - Adult Dermatology Referral  -discussed that she may benefit from intra lesional steroid injections     Essential hypertension  -at goal  -continue current medication  -due for physical, will schedule in the near future     Morbid obesity (H)  Wt Readings from Last 2 Encounters:   12/08/21 125.6 kg (277 lb)   12/02/21 125.6 kg (277 lb)       15 minutes spent on the date of the encounter doing chart review, history and exam, documentation and further activities per the note       BMI:   Estimated body mass index is 42.12 kg/m  as calculated from the following:    Height as of 12/8/21: 1.727 m (5' 8\").    Weight as of 12/8/21: 125.6 kg (277 lb).       Return in about 3 months (around 6/1/2022) for Routine preventive, with me, in person.    Aleyda Briggs MD MPH    [facility]    Kisha Berrios is a 41 year old who presents for the following health issues:    HPI     Concern - Keloid  Onset: about 1 year  Description: Left Ear  Intensity: moderate  Progression of Symptoms:  Worsening  Started a year ago  Increasing in size  It does get itchy  No medication used for this  No drainage or bleeding  No other lumps  Started after ear piercing       Hypertension Follow-up      Do you check your blood pressure regularly outside of the clinic? Yes     Are you following a low salt diet? Yes    Are your blood pressures ever more than 140 on the top number (systolic) OR more   than 90 on the bottom number (diastolic), for example 140/90? No        Review of Systems   Constitutional, HEENT, cardiovascular, pulmonary, gi and " gu systems are negative, except as otherwise noted.      Objective           Vitals:  No vitals were obtained today due to virtual visit.    Physical Exam   GENERAL: Healthy, alert and no distress  EYES: Eyes grossly normal to inspection.  No discharge or erythema, or obvious scleral/conjunctival abnormalities.  RESP: No audible wheeze, cough, or visible cyanosis.  No visible retractions or increased work of breathing.    SKIN: Visible skin clear. No significant rash, abnormal pigmentation or lesions.  NEURO: Cranial nerves grossly intact.  Mentation and speech appropriate for age.  PSYCH: Mentation appears normal, affect normal/bright, judgement and insight intact, normal speech and appearance well-groomed.  Left Ear: difficult to see clearly but there is a cystic appearing lesion on the helix, no edema, no erythema, no drainage.         Video-Visit Details    Type of service:  Video Visit    Video End Time:2:48 PM    Originating Location (pt. Location): Home    Distant Location (provider location):  [facility]     Platform used for Video Visit: [employer]

## 2022-03-09 ENCOUNTER — LAB (OUTPATIENT)
Dept: LAB | Facility: CLINIC | Age: 42
End: 2022-03-09
Attending: STUDENT IN AN ORGANIZED HEALTH CARE EDUCATION/TRAINING PROGRAM
Payer: COMMERCIAL

## 2022-03-09 DIAGNOSIS — M86.462 CHRONIC OSTEOMYELITIS OF LEFT TIBIA WITH DRAINING SINUS (H): ICD-10-CM

## 2022-03-09 LAB
ALBUMIN SERPL-MCNC: 3.5 G/DL (ref 3.4–5)
ALP SERPL-CCNC: 113 U/L (ref 40–150)
ALT SERPL W P-5'-P-CCNC: 56 U/L (ref 0–50)
ANION GAP SERPL CALCULATED.3IONS-SCNC: 5 MMOL/L (ref 3–14)
AST SERPL W P-5'-P-CCNC: 37 U/L (ref 0–45)
BASOPHILS # BLD AUTO: 0 10E3/UL (ref 0–0.2)
BASOPHILS NFR BLD AUTO: 0 %
BILIRUB SERPL-MCNC: 0.2 MG/DL (ref 0.2–1.3)
BUN SERPL-MCNC: 16 MG/DL (ref 7–30)
CALCIUM SERPL-MCNC: 8.6 MG/DL (ref 8.5–10.1)
CHLORIDE BLD-SCNC: 107 MMOL/L (ref 94–109)
CO2 SERPL-SCNC: 29 MMOL/L (ref 20–32)
CREAT SERPL-MCNC: 0.81 MG/DL (ref 0.52–1.04)
CRP SERPL-MCNC: 11.3 MG/L (ref 0–8)
EOSINOPHIL # BLD AUTO: 0.1 10E3/UL (ref 0–0.7)
EOSINOPHIL NFR BLD AUTO: 2 %
ERYTHROCYTE [DISTWIDTH] IN BLOOD BY AUTOMATED COUNT: 17.2 % (ref 10–15)
ERYTHROCYTE [SEDIMENTATION RATE] IN BLOOD BY WESTERGREN METHOD: 47 MM/HR (ref 0–20)
GFR SERPL CREATININE-BSD FRML MDRD: >90 ML/MIN/1.73M2
GLUCOSE BLD-MCNC: 112 MG/DL (ref 70–99)
HCT VFR BLD AUTO: 34.8 % (ref 35–47)
HGB BLD-MCNC: 11.2 G/DL (ref 11.7–15.7)
IMM GRANULOCYTES # BLD: 0 10E3/UL
IMM GRANULOCYTES NFR BLD: 0 %
LYMPHOCYTES # BLD AUTO: 2.3 10E3/UL (ref 0.8–5.3)
LYMPHOCYTES NFR BLD AUTO: 40 %
MCH RBC QN AUTO: 24.1 PG (ref 26.5–33)
MCHC RBC AUTO-ENTMCNC: 32.2 G/DL (ref 31.5–36.5)
MCV RBC AUTO: 75 FL (ref 78–100)
MONOCYTES # BLD AUTO: 0.4 10E3/UL (ref 0–1.3)
MONOCYTES NFR BLD AUTO: 7 %
NEUTROPHILS # BLD AUTO: 2.9 10E3/UL (ref 1.6–8.3)
NEUTROPHILS NFR BLD AUTO: 51 %
NRBC # BLD AUTO: 0 10E3/UL
NRBC BLD AUTO-RTO: 0 /100
PLATELET # BLD AUTO: 244 10E3/UL (ref 150–450)
POTASSIUM BLD-SCNC: 3.7 MMOL/L (ref 3.4–5.3)
PROT SERPL-MCNC: 8.7 G/DL (ref 6.8–8.8)
RBC # BLD AUTO: 4.65 10E6/UL (ref 3.8–5.2)
SODIUM SERPL-SCNC: 141 MMOL/L (ref 133–144)
WBC # BLD AUTO: 5.7 10E3/UL (ref 4–11)

## 2022-03-09 PROCEDURE — 80053 COMPREHEN METABOLIC PANEL: CPT | Performed by: PATHOLOGY

## 2022-03-09 PROCEDURE — 86140 C-REACTIVE PROTEIN: CPT | Performed by: PATHOLOGY

## 2022-03-09 PROCEDURE — 85652 RBC SED RATE AUTOMATED: CPT | Performed by: PATHOLOGY

## 2022-03-09 PROCEDURE — 85025 COMPLETE CBC W/AUTO DIFF WBC: CPT | Performed by: PATHOLOGY

## 2022-03-09 PROCEDURE — 36415 COLL VENOUS BLD VENIPUNCTURE: CPT | Performed by: PATHOLOGY

## 2022-03-14 ENCOUNTER — OFFICE VISIT (OUTPATIENT)
Dept: INFECTIOUS DISEASES | Facility: CLINIC | Age: 42
End: 2022-03-14
Attending: STUDENT IN AN ORGANIZED HEALTH CARE EDUCATION/TRAINING PROGRAM
Payer: COMMERCIAL

## 2022-03-14 ENCOUNTER — ANCILLARY PROCEDURE (OUTPATIENT)
Dept: GENERAL RADIOLOGY | Facility: CLINIC | Age: 42
End: 2022-03-14
Attending: STUDENT IN AN ORGANIZED HEALTH CARE EDUCATION/TRAINING PROGRAM
Payer: COMMERCIAL

## 2022-03-14 VITALS
DIASTOLIC BLOOD PRESSURE: 89 MMHG | OXYGEN SATURATION: 100 % | WEIGHT: 278.2 LBS | SYSTOLIC BLOOD PRESSURE: 135 MMHG | HEART RATE: 84 BPM | TEMPERATURE: 98.3 F | BODY MASS INDEX: 42.3 KG/M2

## 2022-03-14 DIAGNOSIS — M86.462 CHRONIC OSTEOMYELITIS OF LEFT TIBIA WITH DRAINING SINUS (H): Primary | ICD-10-CM

## 2022-03-14 PROCEDURE — 99215 OFFICE O/P EST HI 40 MIN: CPT | Performed by: STUDENT IN AN ORGANIZED HEALTH CARE EDUCATION/TRAINING PROGRAM

## 2022-03-14 PROCEDURE — G0463 HOSPITAL OUTPT CLINIC VISIT: HCPCS

## 2022-03-14 PROCEDURE — 73590 X-RAY EXAM OF LOWER LEG: CPT | Mod: LT | Performed by: RADIOLOGY

## 2022-03-14 ASSESSMENT — PAIN SCALES - GENERAL: PAINLEVEL: NO PAIN (0)
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
## 2022-03-14 NOTE — NURSING NOTE
Chief Complaint   Patient presents with     RECHECK       /89   Pulse 84   Temp 98.3  F (36.8  C) (Oral)   Wt 126.2 kg (278 lb 3.2 oz)   SpO2 100%   BMI 42.30 kg/m      Drew Austin MA on 3/14/2022 at 2:52 PM

## 2022-03-14 NOTE — LETTER
Date:March 17, 2022      Patient was self referred, no letter generated. Do not send.        [facility] Health Information
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
## 2022-03-16 RX ORDER — CIPROFLOXACIN 500 MG/1
500 TABLET, FILM COATED ORAL 2 TIMES DAILY
Qty: 60 TABLET | Refills: 0 | Status: SHIPPED | OUTPATIENT
Start: 2022-03-16 | End: 2023-05-30

## 2022-04-21 DIAGNOSIS — M86.462 CHRONIC OSTEOMYELITIS OF LEFT TIBIA WITH DRAINING SINUS (H): ICD-10-CM

## 2022-04-22 RX ORDER — CIPROFLOXACIN 500 MG/1
TABLET, FILM COATED ORAL
Qty: 60 TABLET | Refills: 0 | OUTPATIENT
Start: 2022-04-22

## 2022-05-21 ENCOUNTER — HEALTH MAINTENANCE LETTER (OUTPATIENT)
Age: 42
End: 2022-05-21

## 2022-06-08 ENCOUNTER — LAB REQUISITION (OUTPATIENT)
Dept: LAB | Facility: CLINIC | Age: 42
End: 2022-06-08

## 2022-06-08 PROCEDURE — U0003 INFECTIOUS AGENT DETECTION BY NUCLEIC ACID (DNA OR RNA); SEVERE ACUTE RESPIRATORY SYNDROME CORONAVIRUS 2 (SARS-COV-2) (CORONAVIRUS DISEASE [COVID-19]), AMPLIFIED PROBE TECHNIQUE, MAKING USE OF HIGH THROUGHPUT TECHNOLOGIES AS DESCRIBED BY CMS-2020-01-R: HCPCS | Performed by: FAMILY MEDICINE

## 2022-06-09 LAB — SARS-COV-2 RNA RESP QL NAA+PROBE: NEGATIVE

## 2022-06-10 ENCOUNTER — LAB REQUISITION (OUTPATIENT)
Dept: LAB | Facility: CLINIC | Age: 42
End: 2022-06-10

## 2022-06-13 PROCEDURE — U0005 INFEC AGEN DETEC AMPLI PROBE: HCPCS | Performed by: FAMILY MEDICINE

## 2022-06-14 LAB — SARS-COV-2 RNA RESP QL NAA+PROBE: NEGATIVE

## 2022-06-21 ENCOUNTER — LAB REQUISITION (OUTPATIENT)
Dept: LAB | Facility: CLINIC | Age: 42
End: 2022-06-21

## 2022-06-21 PROCEDURE — U0003 INFECTIOUS AGENT DETECTION BY NUCLEIC ACID (DNA OR RNA); SEVERE ACUTE RESPIRATORY SYNDROME CORONAVIRUS 2 (SARS-COV-2) (CORONAVIRUS DISEASE [COVID-19]), AMPLIFIED PROBE TECHNIQUE, MAKING USE OF HIGH THROUGHPUT TECHNOLOGIES AS DESCRIBED BY CMS-2020-01-R: HCPCS | Performed by: FAMILY MEDICINE

## 2022-06-22 LAB — SARS-COV-2 RNA RESP QL NAA+PROBE: NEGATIVE

## 2022-07-12 ENCOUNTER — TELEPHONE (OUTPATIENT)
Dept: DERMATOLOGY | Facility: CLINIC | Age: 42
End: 2022-07-12
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
## 2022-07-12 NOTE — TELEPHONE ENCOUNTER
RN called and spoke to the patients  Frankie.  Discussed that the keloid would not be removed on the day of the consult with Dr Barrera and a plan will be made on how to move forward.  Discussed that there are a few options, but removal would likely be done with plastics and not derm.  Pts  verbalized understanding and will relay the information to the patient.  Xochilt Yates RN

## 2022-08-19 ENCOUNTER — OFFICE VISIT (OUTPATIENT)
Dept: DERMATOLOGY | Facility: CLINIC | Age: 42
End: 2022-08-19
Payer: COMMERCIAL

## 2022-08-19 DIAGNOSIS — L91.0 KELOID: Primary | ICD-10-CM

## 2022-08-19 PROCEDURE — 11900 INJECT SKIN LESIONS </W 7: CPT | Performed by: DERMATOLOGY

## 2022-08-19 NOTE — PROGRESS NOTES
[facility] Dermatology Note  Encounter Date: Aug 19, 2022  Office Visit     Dermatology Problem List:  1. Keloid  - s/p ILK 8/19/22    ____________________________________________    Assessment & Plan:    # Keloid  - left superior helix. Discussed the etiology of the condition and potential treatment options including ILK and excision with aldara and or 5-fU.  After discussion, patient is agreeable to doing ILK. Pt is not pregnant or BF. I reviewed a series of 6 months is required.    - See procedure note.     Procedures Performed:   - Intra-lesional triamcinolone procedure note. After verbal consent and review of risk of pain and skin thinning/atrophy, positioning and cleansing with isopropyl alcohol, 0.15 total mL of triamcinolone 40 mg/mL was injected into 1 lesion(s) on the left superior helix. The patient tolerated the procedure well and left the dermatology clinic in good condition.      Follow-up: 4-8 week(s) in-person, or earlier for new or changing lesions    Staff and Scribe:     Scribe Disclosure:   I, Vitaliy Gavin, am serving as a scribe to document services personally performed by this physician, Dr. Lilian Barrera, based on data collection and the provider's statements to me.       Provider Disclosure:   The documentation recorded by the scribe accurately reflects the services I personally performed and the decisions made by me.    Lilian Barrera MD    Department of Dermatology  [facility]: Phone: [phone], Fax:[phone]  [facility]: Phone: [phone], Fax: [phone]      ____________________________________________    CC: Keloid (L ear- helix- formed after piercing)    HPI:  Ms. Agustina Julian is a(n) 41 year old female who presents today as a new patient for a keloid.    Referred to derm for a suspicious skin lesion on the left ear. A&P states that  "it is a \"cystic appearing lesion on the helix, no edema, no erythema, no drainage.\"    Today, she would like injections for a keloid on her left ear. It developed after she had an ear piercing. She notes that it is not painful or tender, but itches. It has been growing recently.     Patient is otherwise feeling well, without additional skin concerns.    Labs Reviewed:  N/A    Physical Exam:  Vitals: There were no vitals taken for this visit.  SKIN: Focused examination of the left ear was performed.  - 1.5 cm raised plaque on left superior helix  - No other lesions of concern on areas examined.     Medications:  Current Outpatient Medications   Medication     amLODIPine (NORVASC) 10 MG tablet     amoxicillin-clavulanate (AUGMENTIN) 875-125 MG tablet     ciprofloxacin (CIPRO) 500 MG tablet     ciprofloxacin (CIPRO) 500 MG tablet     hydrOXYzine (ATARAX) 25 MG tablet     oxyCODONE (ROXICODONE) 5 MG tablet     No current facility-administered medications for this visit.      Past Medical History:   Patient Active Problem List   Diagnosis     Morbid obesity (H)     Closed fracture of shaft of left tibia with malunion, unspecified fracture morphology, subsequent encounter     Ulcer of left lower leg, with fat layer exposed (H)     Pain in joint, ankle and foot, left     Past Medical History:   Diagnosis Date     HTN (hypertension)      Morbid obesity (H)      Postprocedural non-healing wound 2021    Left tibia s/p osteotomy        CC Aleyda Briggs MD  [address] [place] [address] on close of this encounter.  "

## 2022-08-19 NOTE — LETTER
8/19/2022         RE: Agustina Julian  [address]  [place] [address]        Dear Colleague,    Thank you for referring your patient, Agustina Julian, to the [facility]. Please see a copy of my visit note below.    [facility] Dermatology Note  Encounter Date: Aug 19, 2022  Office Visit     Dermatology Problem List:  1. Keloid  - s/p ILK 8/19/22    ____________________________________________    Assessment & Plan:    # Keloid  - left superior helix. Discussed the etiology of the condition and potential treatment options including ILK and excision with aldara and or 5-fU.  After discussion, patient is agreeable to doing ILK. Pt is not pregnant or BF. I reviewed a series of 6 months is required.    - See procedure note.     Procedures Performed:   - Intra-lesional triamcinolone procedure note. After verbal consent and review of risk of pain and skin thinning/atrophy, positioning and cleansing with isopropyl alcohol, 0.15 total mL of triamcinolone 40 mg/mL was injected into 1 lesion(s) on the left superior helix. The patient tolerated the procedure well and left the dermatology clinic in good condition.      Follow-up: 4-8 week(s) in-person, or earlier for new or changing lesions    Staff and Scribe:     Scribe Disclosure:   I, Vitaliy Gavin, am serving as a scribe to document services personally performed by this physician, Dr. Lilian Barrera, based on data collection and the provider's statements to me.       Provider Disclosure:   The documentation recorded by the scribe accurately reflects the services I personally performed and the decisions made by me.    Lilian Barrera MD    Department of Dermatology  [facility]: Phone: [phone], Fax:[phone]  [facility]: Phone: [phone], Fax:  "[phone]      ____________________________________________    CC: Keloid (L ear- helix- formed after piercing)    HPI:  Ms. Agustina Julian is a(n) 41 year old female who presents today as a new patient for a keloid.    Referred to derm for a suspicious skin lesion on the left ear. A&P states that it is a \"cystic appearing lesion on the helix, no edema, no erythema, no drainage.\"    Today, she would like injections for a keloid on her left ear. It developed after she had an ear piercing. She notes that it is not painful or tender, but itches. It has been growing recently.     Patient is otherwise feeling well, without additional skin concerns.    Labs Reviewed:  N/A    Physical Exam:  Vitals: There were no vitals taken for this visit.  SKIN: Focused examination of the left ear was performed.  - 1.5 cm raised plaque on left superior helix  - No other lesions of concern on areas examined.     Medications:  Current Outpatient Medications   Medication     amLODIPine (NORVASC) 10 MG tablet     amoxicillin-clavulanate (AUGMENTIN) 875-125 MG tablet     ciprofloxacin (CIPRO) 500 MG tablet     ciprofloxacin (CIPRO) 500 MG tablet     hydrOXYzine (ATARAX) 25 MG tablet     oxyCODONE (ROXICODONE) 5 MG tablet     No current facility-administered medications for this visit.      Past Medical History:   Patient Active Problem List   Diagnosis     Morbid obesity (H)     Closed fracture of shaft of left tibia with malunion, unspecified fracture morphology, subsequent encounter     Ulcer of left lower leg, with fat layer exposed (H)     Pain in joint, ankle and foot, left     Past Medical History:   Diagnosis Date     HTN (hypertension)      Morbid obesity (H)      Postprocedural non-healing wound 2021    Left tibia s/p osteotomy        CC Aleyda Briggs MD  [address] [place] [address] on close of this encounter.      Again, thank you for allowing me to participate in the care of your patient.  "       Sincerely,        Lilian Barrera MD

## 2022-08-19 NOTE — PATIENT INSTRUCTIONS
Intralesional Kenalog (ILK) Injections    Intralesional steroid injection involves a corticosteroid, such as triamcinolone acetonide (brand name Kenalog), which is injected directly into a lesion on or immediately below the skin. Intralesional kenalog may be used to treat the following skin conditions:    Alopecia areata  Discoid lupus erythematosus  Keloids/hypertrophic scars  Granuloma annulare and other granulomatous disorders  Hypertrophic lichen planus  Lichen simplex chronicus (neurodermatitis)  Localised psoriasis  Necrobiosis lipoidica  Acne cysts (nodulocystic acne)  Small infantile hemangiomas    Possible side-effects of intralesional Kenalog (ILK) injections include bleeding, pain, infection, contact dermatitis, nerve damage, scar formation and need for a repeat procedure.    Some people may experience delayed side-effects including:  Lipoatrophy, appearing as skin indentations or dimples around the injection sites a few weeks after treatment; these may be permanent.  White marks (leukoderma) or brown marks (postinflammatory pigmentation) at the site of injection or spreading from the site of injection - these may resolve or persist long term.  Telangiectasia, or small dilated blood vessels at the site of injection.   Increased hair growth at the site of injection - this resolves eventually.  Steroid acne: steroids increase growth hormone, leading to increased sebum (oil) production by the sebaceous glands. Steroid acne generally improves once the steroid has been stopped.      Who should I call with questions?  [facility]: [phone]   [facility]: [phone]  For urgent needs outside of business hours call the [facility] at [phone] and ask for the dermatology resident on call

## 2022-09-06 NOTE — NURSING NOTE
Clinic Administered Medication Documentation    Administrations This Visit     triamcinolone acetonide (KENALOG-10) injection 6 mg     Admin Date  08/19/2022 Action  Given Dose  6 mg Route  Intra-Lesional Site   Administered By  Anna Owens CMA    Ordering Provider: Lilian Barrera MD    NDC: 5682-4139-57    Lot#: [id number]    : NoDaysOff [place] (PRIMARY CARE)    Patient Supplied?: No              Late entry.    Anna Owens CMA on 9/6/2022 at 9:10 AM

## 2022-09-18 ENCOUNTER — HEALTH MAINTENANCE LETTER (OUTPATIENT)
Age: 42
End: 2022-09-18

## 2022-10-27 ENCOUNTER — OFFICE VISIT (OUTPATIENT)
Dept: DERMATOLOGY | Facility: CLINIC | Age: 42
End: 2022-10-27
Payer: COMMERCIAL

## 2022-10-27 DIAGNOSIS — L91.0 KELOID SCAR: Primary | ICD-10-CM

## 2022-10-27 PROCEDURE — 11900 INJECT SKIN LESIONS </W 7: CPT | Performed by: DERMATOLOGY

## 2022-10-27 ASSESSMENT — PAIN SCALES - GENERAL: PAINLEVEL: NO PAIN (0)

## 2022-10-27 NOTE — PROGRESS NOTES
[facility] Dermatology Note  Encounter Date: Oct 27, 2022  Office Visit     Dermatology Problem List:  1. Keloid - s/p ILK 8/19/22     ____________________________________________     Assessment & Plan:     # Keloid scar - left superior helix. Patient had keloid removed in [place] on 10/3/22, patient was informed the sutures were dissolving but not dissolved 24 days post surgery. 5 spitting sutures noted today. Will remove stitches today and inject Kenalog for scar management.   - Area was prepped with alcohol, and sutures were removed.   - See ILK procedure note.      Procedures Performed:   - Intra-lesional triamcinolone procedure note. After verbal consent and review of risk of pain and skin thinning/atrophy, positioning and cleansing with isopropyl alcohol, 0.1 total mL of triamcinolone 2.5 mg/mL was injected into 2 injection point(s) on the left superior helix. The patient tolerated the procedure well and left the dermatology clinic in good condition.    Follow-up: 3 month(s) in-person, or earlier for new or changing lesions    Staff and Scribe:     Scribe Disclosure:   I, Vitaliy Gavin, am serving as a scribe to document services personally performed by this physician, Dr. Lilian Barrera, based on data collection and the provider's statements to me.     Provider Disclosure:   The documentation recorded by the scribe accurately reflects the services I personally performed and the decisions made by me.    Lilian Barrera MD    Department of Dermatology  [facility]: Phone: [phone], Fax:[phone]  [facility]: Phone: [phone], Fax: [phone]        ____________________________________________    CC: Scar Management (Left superior helix)    HPI:  Ms. Agustina Julian is a(n) 42 year old female who presents today as a return patient for a keloid.     Last seen 8/19/22  for a keloid. At that time, patient received Kenalog injections into the keloid.     She reports in [place] it was excised on the 4th and sutures were supposed to dissolve    Today, she thinks that is is getting better. She does not think the keloid is coming back.     present    Patient is otherwise feeling well, without additional skin concerns.    Labs Reviewed:  N/A    Physical Exam:  Vitals: There were no vitals taken for this visit.  SKIN: Focused examination of left ear was performed.  - Linear wound with protruding sutures on the left superior helix. 5 spitting sutures noted.   - No other lesions of concern on areas examined.     Medications:  Current Outpatient Medications   Medication     amLODIPine (NORVASC) 10 MG tablet     amoxicillin-clavulanate (AUGMENTIN) 875-125 MG tablet     ciprofloxacin (CIPRO) 500 MG tablet     ciprofloxacin (CIPRO) 500 MG tablet     hydrOXYzine (ATARAX) 25 MG tablet     oxyCODONE (ROXICODONE) 5 MG tablet     No current facility-administered medications for this visit.      Past Medical History:   Patient Active Problem List   Diagnosis     Morbid obesity (H)     Closed fracture of shaft of left tibia with malunion, unspecified fracture morphology, subsequent encounter     Ulcer of left lower leg, with fat layer exposed (H)     Pain in joint, ankle and foot, left     Past Medical History:   Diagnosis Date     HTN (hypertension)      Morbid obesity (H)      Postprocedural non-healing wound 2021    Left tibia s/p osteotomy        CC No referring provider defined for this encounter. on close of this encounter.

## 2022-10-27 NOTE — PATIENT INSTRUCTIONS
Intralesional Kenalog (ILK) Injections    Intralesional steroid injection involves a corticosteroid, such as triamcinolone acetonide (brand name Kenalog), which is injected directly into a lesion on or immediately below the skin. Intralesional kenalog may be used to treat the following skin conditions:    Alopecia areata  Discoid lupus erythematosus  Keloids/hypertrophic scars  Granuloma annulare and other granulomatous disorders  Hypertrophic lichen planus  Lichen simplex chronicus (neurodermatitis)  Localised psoriasis  Necrobiosis lipoidica  Acne cysts (nodulocystic acne)  Small infantile hemangiomas    Possible side-effects of intralesional Kenalog (ILK) injections include bleeding, pain, infection, contact dermatitis, nerve damage, scar formation and need for a repeat procedure.    Some people may experience delayed side-effects including:  Lipoatrophy, appearing as skin indentations or dimples around the injection sites a few weeks after treatment; these may be permanent.  White marks (leukoderma) or brown marks (postinflammatory pigmentation) at the site of injection or spreading from the site of injection - these may resolve or persist long term.  Telangiectasia, or small dilated blood vessels at the site of injection.   Increased hair growth at the site of injection - this resolves eventually.  Steroid acne: steroids increase growth hormone, leading to increased sebum (oil) production by the sebaceous glands. Steroid acne generally improves once the steroid has been stopped.      Who should I call with questions?  [facility]: [phone]   [facility]: [phone]  For urgent needs outside of business hours call the [facility] at [phone] and ask for the dermatology resident on call

## 2022-10-27 NOTE — LETTER
10/27/2022         RE: Agustina Julian  [address]  [place] [address]        Dear Colleague,    Thank you for referring your patient, Agustina Julian, to the [facility]. Please see a copy of my visit note below.    [facility] Dermatology Note  Encounter Date: Oct 27, 2022  Office Visit     Dermatology Problem List:  1. Keloid - s/p ILK 8/19/22     ____________________________________________     Assessment & Plan:     # Keloid scar - left superior helix. Patient had keloid removed in [place] on 10/3/22, patient was informed the sutures were dissolving but not dissolved 24 days post surgery. 5 spitting sutures noted today. Will remove stitches today and inject Kenalog for scar management.   - Area was prepped with alcohol, and sutures were removed.   - See ILK procedure note.      Procedures Performed:   - Intra-lesional triamcinolone procedure note. After verbal consent and review of risk of pain and skin thinning/atrophy, positioning and cleansing with isopropyl alcohol, 0.1 total mL of triamcinolone 2.5 mg/mL was injected into 2 injection point(s) on the left superior helix. The patient tolerated the procedure well and left the dermatology clinic in good condition.    Follow-up: 3 month(s) in-person, or earlier for new or changing lesions    Staff and Scribe:     Scribe Disclosure:   I, Vitaliy Gavin, am serving as a scribe to document services personally performed by this physician, Dr. Lilian Barrera, based on data collection and the provider's statements to me.     Provider Disclosure:   The documentation recorded by the scribe accurately reflects the services I personally performed and the decisions made by me.    Lilian Barrera MD    Department of Dermatology  [facility]: Phone: [phone], Fax:[phone]  [facility]: Phone:  [phone], Fax: [phone]        ____________________________________________    CC: Scar Management (Left superior helix)    HPI:  Ms. Agustina Julian is a(n) 42 year old female who presents today as a return patient for a keloid.     Last seen 8/19/22 for a keloid. At that time, patient received Kenalog injections into the keloid.     She reports in [place] it was excised on the 4th and sutures were supposed to dissolve    Today, she thinks that is is getting better. She does not think the keloid is coming back.     present    Patient is otherwise feeling well, without additional skin concerns.    Labs Reviewed:  N/A    Physical Exam:  Vitals: There were no vitals taken for this visit.  SKIN: Focused examination of left ear was performed.  - Linear wound with protruding sutures on the left superior helix. 5 spitting sutures noted.   - No other lesions of concern on areas examined.     Medications:  Current Outpatient Medications   Medication     amLODIPine (NORVASC) 10 MG tablet     amoxicillin-clavulanate (AUGMENTIN) 875-125 MG tablet     ciprofloxacin (CIPRO) 500 MG tablet     ciprofloxacin (CIPRO) 500 MG tablet     hydrOXYzine (ATARAX) 25 MG tablet     oxyCODONE (ROXICODONE) 5 MG tablet     No current facility-administered medications for this visit.      Past Medical History:   Patient Active Problem List   Diagnosis     Morbid obesity (H)     Closed fracture of shaft of left tibia with malunion, unspecified fracture morphology, subsequent encounter     Ulcer of left lower leg, with fat layer exposed (H)     Pain in joint, ankle and foot, left     Past Medical History:   Diagnosis Date     HTN (hypertension)      Morbid obesity (H)      Postprocedural non-healing wound 2021    Left tibia s/p osteotomy        CC No referring provider defined for this encounter. on close of this encounter.       Again, thank you for allowing me to participate in the care of your patient.        Sincerely,        Lilian  MD [place]

## 2022-10-27 NOTE — NURSING NOTE
Agustina Julian's chief complaint for this visit includes:  Chief Complaint   Patient presents with     Scar Management     Left superior helix     PCP: No Ref-Primary, Physician    Referring Provider:  No referring provider defined for this encounter.    There were no vitals taken for this visit.  No Pain (0)      No Known Allergies      Do you need any medication refills at today's visit? No    Kinsey Regalado CMA

## 2023-02-02 ENCOUNTER — OFFICE VISIT (OUTPATIENT)
Dept: DERMATOLOGY | Facility: CLINIC | Age: 43
End: 2023-02-02
Payer: COMMERCIAL

## 2023-02-02 DIAGNOSIS — L91.0 KELOID SCAR: Primary | ICD-10-CM

## 2023-02-02 PROCEDURE — 11900 INJECT SKIN LESIONS </W 7: CPT | Performed by: DERMATOLOGY

## 2023-02-02 NOTE — PROGRESS NOTES
[facility] Dermatology Note  Encounter Date: Feb 2, 2023  Office Visit     Dermatology Problem List:  1. Keloid - s/p ILK 8/19/22, 10/27/22, 2/2/23     ____________________________________________     Assessment & Plan:     # Keloid scar - left superior helix. Patient reports improvement but some pruritus, will treat further with ILK injections today.  Excellent results today   - See ILK procedure note below.       Procedures Performed:   - Intra-lesional triamcinolone procedure note. After verbal consent and review of risk of pain and skin thinning/atrophy, positioning and cleansing with isopropyl alcohol, 0.2 total mL of triamcinolone 5 mg/mL was injected into 1 lesion(s) on the left helix. The patient tolerated the procedure well and left the dermatology clinic in good condition.     Follow-up: 4 months(s) in-person, or earlier for new or changing lesions.     Staff and Scribe:     Scribe Disclosure:   I, Vitaliy Gavin, am serving as a scribe to document services personally performed by this physician, Dr. Lilian Barrera, based on data collection and the provider's statements to me.     Provider Disclosure:   The documentation recorded by the scribe accurately reflects the services I personally performed and the decisions made by me.    Lilian Barrera MD    Department of Dermatology  [facility]: Phone: [phone], Fax:[phone]  [facility]: Phone: [phone], Fax: [phone]      ____________________________________________    CC: Derm Problem (Keloid left ear, improving with steroid injections)    HPI:  Ms. Agustina Julian is a(n) 42 year old female who presents today as a return patient for keloid scar.    Last seen 10/27/22 for a keloid scar on the left superior helix. At that time, Kenalog was injected into the lesion.     Today, she reports the ear is improving  but still somewhat itchy.     Patient is otherwise feeling well, without additional skin concerns.    Labs Reviewed:  N/A    Physical Exam:  Vitals: There were no vitals taken for this visit.  SKIN: Focused examination of left ear was performed.  - slight keloidal scarring with hyperpigmentation on the posterior aspect of the left helix.    - No other lesions of concern on areas examined.     Medications:  Current Outpatient Medications   Medication     amLODIPine (NORVASC) 10 MG tablet     amoxicillin-clavulanate (AUGMENTIN) 875-125 MG tablet     ciprofloxacin (CIPRO) 500 MG tablet     ciprofloxacin (CIPRO) 500 MG tablet     hydrOXYzine (ATARAX) 25 MG tablet     oxyCODONE (ROXICODONE) 5 MG tablet     Current Facility-Administered Medications   Medication     triamcinolone acetonide (KENALOG-10) injection 1 mg      Past Medical History:   Patient Active Problem List   Diagnosis     Morbid obesity (H)     Closed fracture of shaft of left tibia with malunion, unspecified fracture morphology, subsequent encounter     Ulcer of left lower leg, with fat layer exposed (H)     Pain in joint, ankle and foot, left     Past Medical History:   Diagnosis Date     HTN (hypertension)      Morbid obesity (H)      Postprocedural non-healing wound 2021    Left tibia s/p osteotomy        CC No referring provider defined for this encounter. on close of this encounter.

## 2023-02-02 NOTE — NURSING NOTE
Agustina Julian's goals for this visit include:   Chief Complaint   Patient presents with     Derm Problem     Keloid left ear, steroid injection        She requests these members of her care team be copied on today's visit information: no    PCP: No Ref-Primary, Physician    Referring Provider:  No referring provider defined for this encounter.    There were no vitals taken for this visit.    Do you need any medication refills at today's visit? No    Meera Veliz LPN      
18

## 2023-02-02 NOTE — LETTER
2/2/2023         RE: Agustina Julian  [address]  [place] [address]        Dear Colleague,    Thank you for referring your patient, Agustina Julian, to the [facility]. Please see a copy of my visit note below.    [facility] Dermatology Note  Encounter Date: Feb 2, 2023  Office Visit     Dermatology Problem List:  1. Keloid - s/p ILK 8/19/22, 10/27/22, 2/2/23     ____________________________________________     Assessment & Plan:     # Keloid scar - left superior helix. Patient reports improvement but some pruritus, will treat further with ILK injections today.  Excellent results today   - See ILK procedure note below.       Procedures Performed:   - Intra-lesional triamcinolone procedure note. After verbal consent and review of risk of pain and skin thinning/atrophy, positioning and cleansing with isopropyl alcohol, 0.2 total mL of triamcinolone 5 mg/mL was injected into 1 lesion(s) on the left helix. The patient tolerated the procedure well and left the dermatology clinic in good condition.     Follow-up: 4 months(s) in-person, or earlier for new or changing lesions.     Staff and Scribe:     Scribe Disclosure:   I, Vitaliy Gavin, am serving as a scribe to document services personally performed by this physician, Dr. Lilian Barrera, based on data collection and the provider's statements to me.     Provider Disclosure:   The documentation recorded by the scribe accurately reflects the services I personally performed and the decisions made by me.    Lilian Barrera MD    Department of Dermatology  [facility]: Phone: [phone], Fax:[phone]  [facility]: Phone: [phone], Fax: [phone]      ____________________________________________    CC: Derm Problem (Keloid left ear, improving with steroid injections)    HPI:  Ms. Agustina  Eliel is a(n) 42 year old female who presents today as a return patient for keloid scar.    Last seen 10/27/22 for a keloid scar on the left superior helix. At that time, Kenalog was injected into the lesion.     Today, she reports the ear is improving but still somewhat itchy.     Patient is otherwise feeling well, without additional skin concerns.    Labs Reviewed:  N/A    Physical Exam:  Vitals: There were no vitals taken for this visit.  SKIN: Focused examination of left ear was performed.  - slight keloidal scarring with hyperpigmentation on the posterior aspect of the left helix.    - No other lesions of concern on areas examined.     Medications:  Current Outpatient Medications   Medication     amLODIPine (NORVASC) 10 MG tablet     amoxicillin-clavulanate (AUGMENTIN) 875-125 MG tablet     ciprofloxacin (CIPRO) 500 MG tablet     ciprofloxacin (CIPRO) 500 MG tablet     hydrOXYzine (ATARAX) 25 MG tablet     oxyCODONE (ROXICODONE) 5 MG tablet     Current Facility-Administered Medications   Medication     triamcinolone acetonide (KENALOG-10) injection 1 mg      Past Medical History:   Patient Active Problem List   Diagnosis     Morbid obesity (H)     Closed fracture of shaft of left tibia with malunion, unspecified fracture morphology, subsequent encounter     Ulcer of left lower leg, with fat layer exposed (H)     Pain in joint, ankle and foot, left     Past Medical History:   Diagnosis Date     HTN (hypertension)      Morbid obesity (H)      Postprocedural non-healing wound 2021    Left tibia s/p osteotomy        CC No referring provider defined for this encounter. on close of this encounter.       Again, thank you for allowing me to participate in the care of your patient.        Sincerely,        Lilian Barrera MD

## 2023-04-07 DIAGNOSIS — I10 ESSENTIAL HYPERTENSION: ICD-10-CM

## 2023-04-10 RX ORDER — AMLODIPINE BESYLATE 10 MG/1
TABLET ORAL
Qty: 90 TABLET | Refills: 0 | OUTPATIENT
Start: 2023-04-10

## 2023-04-10 NOTE — TELEPHONE ENCOUNTER
Due for appointment, over a year since last visit. OK to do Virtual as long as patient is willing to come in for a lab only visit. Thank you, Aleyda Briggs MD MPH

## 2023-05-25 DIAGNOSIS — I10 ESSENTIAL HYPERTENSION: ICD-10-CM

## 2023-05-26 RX ORDER — AMLODIPINE BESYLATE 10 MG/1
TABLET ORAL
Qty: 30 TABLET | Refills: 0 | Status: SHIPPED | OUTPATIENT
Start: 2023-05-26 | End: 2023-05-30

## 2023-05-26 NOTE — TELEPHONE ENCOUNTER
Called and left a voicemail message regarding a mason refill sent to the patient's pharmacy and to return our call to schedule a visit for further refills.  Lin Nielsen [facility]  2nd Floor  Primary Care

## 2023-05-30 ENCOUNTER — OFFICE VISIT (OUTPATIENT)
Dept: FAMILY MEDICINE | Facility: CLINIC | Age: 43
End: 2023-05-30
Payer: COMMERCIAL

## 2023-05-30 VITALS
SYSTOLIC BLOOD PRESSURE: 138 MMHG | WEIGHT: 273.4 LBS | BODY MASS INDEX: 41.57 KG/M2 | DIASTOLIC BLOOD PRESSURE: 88 MMHG | OXYGEN SATURATION: 98 % | TEMPERATURE: 98.2 F | RESPIRATION RATE: 16 BRPM | HEART RATE: 81 BPM

## 2023-05-30 DIAGNOSIS — Z53.20 CERVICAL CANCER SCREENING DECLINED: ICD-10-CM

## 2023-05-30 DIAGNOSIS — E66.01 MORBID OBESITY (H): ICD-10-CM

## 2023-05-30 DIAGNOSIS — I10 ESSENTIAL HYPERTENSION: Primary | ICD-10-CM

## 2023-05-30 DIAGNOSIS — M86.462 CHRONIC OSTEOMYELITIS OF LEFT TIBIA WITH DRAINING SINUS (H): ICD-10-CM

## 2023-05-30 DIAGNOSIS — L97.922 ULCER OF LEFT LOWER LEG, WITH FAT LAYER EXPOSED (H): ICD-10-CM

## 2023-05-30 DIAGNOSIS — Z53.20 MAMMOGRAM DECLINED: ICD-10-CM

## 2023-05-30 LAB
ANION GAP SERPL CALCULATED.3IONS-SCNC: 6 MMOL/L (ref 3–14)
BUN SERPL-MCNC: 12 MG/DL (ref 7–30)
CALCIUM SERPL-MCNC: 9.6 MG/DL (ref 8.5–10.1)
CHLORIDE BLD-SCNC: 106 MMOL/L (ref 94–109)
CHOLEST SERPL-MCNC: 272 MG/DL
CO2 SERPL-SCNC: 28 MMOL/L (ref 20–32)
CREAT SERPL-MCNC: 0.74 MG/DL (ref 0.52–1.04)
CREAT UR-MCNC: 58 MG/DL
FASTING STATUS PATIENT QL REPORTED: YES
GFR SERPL CREATININE-BSD FRML MDRD: >90 ML/MIN/1.73M2
GLUCOSE BLD-MCNC: 110 MG/DL (ref 70–99)
HDLC SERPL-MCNC: 72 MG/DL
LDLC SERPL CALC-MCNC: 187 MG/DL
MICROALBUMIN UR-MCNC: 13 MG/L
MICROALBUMIN/CREAT UR: 22.41 MG/G CR (ref 0–25)
NONHDLC SERPL-MCNC: 200 MG/DL
POTASSIUM BLD-SCNC: 3.8 MMOL/L (ref 3.4–5.3)
SODIUM SERPL-SCNC: 140 MMOL/L (ref 133–144)
TRIGL SERPL-MCNC: 63 MG/DL

## 2023-05-30 PROCEDURE — 80061 LIPID PANEL: CPT | Performed by: PREVENTIVE MEDICINE

## 2023-05-30 PROCEDURE — 82570 ASSAY OF URINE CREATININE: CPT | Performed by: PREVENTIVE MEDICINE

## 2023-05-30 PROCEDURE — 99213 OFFICE O/P EST LOW 20 MIN: CPT | Performed by: PREVENTIVE MEDICINE

## 2023-05-30 PROCEDURE — 82043 UR ALBUMIN QUANTITATIVE: CPT | Performed by: PREVENTIVE MEDICINE

## 2023-05-30 PROCEDURE — 80048 BASIC METABOLIC PNL TOTAL CA: CPT | Performed by: PREVENTIVE MEDICINE

## 2023-05-30 PROCEDURE — 36415 COLL VENOUS BLD VENIPUNCTURE: CPT | Performed by: PREVENTIVE MEDICINE

## 2023-05-30 RX ORDER — AMLODIPINE BESYLATE 10 MG/1
10 TABLET ORAL DAILY
Qty: 90 TABLET | Refills: 3 | Status: SHIPPED | OUTPATIENT
Start: 2023-05-30 | End: 2024-05-14

## 2023-05-30 RX ORDER — AZITHROMYCIN 500 MG/1
500 TABLET, FILM COATED ORAL
COMMUNITY
Start: 2022-09-12 | End: 2023-05-30

## 2023-05-30 RX ORDER — ATOVAQUONE AND PROGUANIL HYDROCHLORIDE 250; 100 MG/1; MG/1
TABLET, FILM COATED ORAL
COMMUNITY
Start: 2022-09-12 | End: 2023-05-30

## 2023-05-30 NOTE — RESULT ENCOUNTER NOTE
Dear Agustina Julian    Here are your cholesterol results:    Your LDL is: Lab Results       Component                Value               Date                       LDL                      187                 05/30/2023                 LDL                      178                 01/27/2021          Your LDL goal is to be less than 130  Your HDL is: Lab Results       Component                Value               Date                       HDL                      72                  05/30/2023                 HDL                      68                  01/27/2021           Goal HDL is Greater than 40 (for men) or 50 (for women).  Your Triglycerides are: Lab Results       Component                Value               Date                       TRIG                     63                  05/30/2023                 TRIG                     92                  01/27/2021         Goal TRIGLYCERIDES are less than 150.     Here are some ways to improve your cholesterol without medication:    Try to get at least 45 minutes of aerobic exercise 5-6 days a week  Maintain a healthy body weight  Eat less saturated fats  Buy lean cuts of meat, reduce your portions of red meat or substitute poultry or fish  Avoid fried or fast foods that are high in fat  Eat more fruits and vegetables    Electrolytes, non fasting glucose and kidney function are normal.     Please do not hesitate to call us at [phone] if you have any questions or concerns.    Thank you,    Aleyda Briggs MD MPH

## 2023-05-30 NOTE — PROGRESS NOTES
Assessment & Plan     Essential hypertension  -at goal at home  -refills provided  -await labs   - amLODIPine (NORVASC) 10 MG tablet  Dispense: 90 tablet; Refill: 3  - Lipid panel reflex to direct LDL Non-fasting  - Albumin Random Urine Quantitative with Creat Ratio  - Basic metabolic panel      Chronic osteomyelitis of left tibia with draining sinus (H)  -resolved  -ha been seen by ID and no longer on antibiotics    Ulcer of left lower leg, with fat layer exposed (H)  -healed     Morbid obesity (H)  Wt Readings from Last 2 Encounters:   05/30/23 124 kg (273 lb 6.4 oz)   03/14/22 126.2 kg (278 lb 3.2 oz)     -goal of 150 minutes of moderate exercise per week reviewed     Mammogram declined  -declined     Cervical cancer screening declined  -declined       Ordering of each unique test  Prescription drug management  15 minutes spent by me on the date of the encounter doing chart review, history and exam, documentation and further activities per the note         Aleyda Briggs MD MPH    [facility]    Kisha Berrios is a 42 year old, presenting for the following health issues:  Refill Request        5/30/2023     9:40 AM   Additional Questions   Roomed by rodriguez     History of Present Illness       Hypertension: She presents for follow up of hypertension.  She does check blood pressure  regularly outside of the clinic. Outpatient blood pressures have not been over 140/90. She follows a low salt diet.     She eats 4 or more servings of fruits and vegetables daily.She consumes 1 sweetened beverage(s) daily.She exercises with enough effort to increase her heart rate 60 or more minutes per day.  She exercises with enough effort to increase her heart rate 7 days per week.   She is taking medications regularly.       No exercise  No tobacco  No alcohol  Home BP readings 130s/88  Hypertension Follow-up      Do you check your blood pressure regularly outside of the clinic? Yes     Are you  following a low salt diet? Yes    Are your blood pressures ever more than 140 on the top number (systolic) OR more   than 90 on the bottom number (diastolic), for example 140/90? No    NO chest pain  No headaches  No vision changes  No dizziness  No pedal edema    Low back pain:  -few days  -one place  -no lifting  -falls  -pain does not prevent sleep  -Better with taking Tylenol   -we discussed using heating pad, over the counter Icy Hot patch etc.     Declined mammogram and cervical cancer screening    Review of Systems   Constitutional, HEENT, cardiovascular, pulmonary, gi and gu systems are negative, except as otherwise noted.      Objective    /88   Pulse 81   Temp 98.2  F (36.8  C) (Oral)   Resp 16   Wt 124 kg (273 lb 6.4 oz)   LMP 05/04/2023   SpO2 98%   BMI 41.57 kg/m    Body mass index is 41.57 kg/m .  Physical Exam   GENERAL APPEARANCE: healthy, alert and no distress  EYES: Eyes grossly normal to inspection and conjunctivae and sclerae normal  RESP: lungs clear to auscultation - no rales, rhonchi or wheezes  CV: regular rates and rhythm, normal S1 S2, no S3 or S4 and no murmur, click or rub  ABDOMEN: soft, non-tender and no rebound or guarding   MS: no edema   SKIN: no suspicious lesions or rashes  NEURO: Normal strength and tone, mentation intact and speech normal  PSYCH: mentation appears normal    Lumbar spine: No swelling, bruising, erythema atrophy or deformity.  No tenderness noted on palpation of spinous processes.  Range of motion of the lumbar spine is full in all directions without focal deficit.  Strength is full.  SLR negative bilaterally.  Distal pulses intact. No sacroiliac tenderness bilaterally.  Great toe extension normal.         No results found for this or any previous visit (from the past 24 hour(s)).

## 2023-05-30 NOTE — RESULT ENCOUNTER NOTE
Agustina,     Urine sample is not showing any abnormal protein.     Please do not hesitate to call us at [phone] if you have any questions or concerns.    Thank you,    Aleyda Briggs MD MPH

## 2023-06-04 ENCOUNTER — HEALTH MAINTENANCE LETTER (OUTPATIENT)
Age: 43
End: 2023-06-04

## 2023-07-05 ENCOUNTER — TELEPHONE (OUTPATIENT)
Dept: FAMILY MEDICINE | Facility: CLINIC | Age: 43
End: 2023-07-05
Payer: COMMERCIAL

## 2023-07-05 NOTE — TELEPHONE ENCOUNTER
Patient Quality Outreach    Patient is due for the following:   Cervical Cancer Screening - PAP Needed  Physical Preventive Adult Physical      Topic Date Due     COVID-19 Vaccine (4 - Moderna series) 01/24/2022     Hepatitis B Vaccine (2 of 3 - 19+ 3-dose series) 10/10/2022       Next Steps:   Schedule a Adult Preventative    Type of outreach:    Sent [employer] Plus message.      Questions for provider review:               Sherrie Langford MA

## 2024-02-25 ENCOUNTER — HEALTH MAINTENANCE LETTER (OUTPATIENT)
Age: 44
End: 2024-02-25

## 2024-03-06 ENCOUNTER — OFFICE VISIT (OUTPATIENT)
Dept: FAMILY MEDICINE | Facility: CLINIC | Age: 44
End: 2024-03-06
Payer: COMMERCIAL

## 2024-03-06 VITALS
WEIGHT: 273 LBS | OXYGEN SATURATION: 100 % | SYSTOLIC BLOOD PRESSURE: 135 MMHG | HEART RATE: 79 BPM | BODY MASS INDEX: 41.37 KG/M2 | DIASTOLIC BLOOD PRESSURE: 85 MMHG | TEMPERATURE: 98 F | HEIGHT: 68 IN | RESPIRATION RATE: 20 BRPM

## 2024-03-06 DIAGNOSIS — Z53.20 MAMMOGRAM DECLINED: ICD-10-CM

## 2024-03-06 DIAGNOSIS — Z23 ENCOUNTER FOR IMMUNIZATION: ICD-10-CM

## 2024-03-06 DIAGNOSIS — I10 ESSENTIAL HYPERTENSION: ICD-10-CM

## 2024-03-06 DIAGNOSIS — Z71.84 TRAVEL ADVICE ENCOUNTER: Primary | ICD-10-CM

## 2024-03-06 DIAGNOSIS — Z53.20 CERVICAL CANCER SCREENING DECLINED: ICD-10-CM

## 2024-03-06 PROCEDURE — 90632 HEPA VACCINE ADULT IM: CPT | Performed by: PREVENTIVE MEDICINE

## 2024-03-06 PROCEDURE — 90472 IMMUNIZATION ADMIN EACH ADD: CPT | Performed by: PREVENTIVE MEDICINE

## 2024-03-06 PROCEDURE — 90686 IIV4 VACC NO PRSV 0.5 ML IM: CPT | Performed by: PREVENTIVE MEDICINE

## 2024-03-06 PROCEDURE — 90746 HEPB VACCINE 3 DOSE ADULT IM: CPT | Performed by: PREVENTIVE MEDICINE

## 2024-03-06 PROCEDURE — 90471 IMMUNIZATION ADMIN: CPT | Performed by: PREVENTIVE MEDICINE

## 2024-03-06 PROCEDURE — 99213 OFFICE O/P EST LOW 20 MIN: CPT | Mod: 25 | Performed by: PREVENTIVE MEDICINE

## 2024-03-06 RX ORDER — AZITHROMYCIN 500 MG/1
500 TABLET, FILM COATED ORAL DAILY
Qty: 3 TABLET | Refills: 0 | Status: SHIPPED | OUTPATIENT
Start: 2024-03-06 | End: 2024-03-09

## 2024-03-06 RX ORDER — ATOVAQUONE AND PROGUANIL HYDROCHLORIDE 250; 100 MG/1; MG/1
1 TABLET, FILM COATED ORAL DAILY
Qty: 39 TABLET | Refills: 0 | Status: SHIPPED | OUTPATIENT
Start: 2024-03-06

## 2024-03-06 ASSESSMENT — PAIN SCALES - GENERAL: PAINLEVEL: NO PAIN (0)

## 2024-03-06 NOTE — PROGRESS NOTES
{PROVIDER CHARTING PREFERENCE:179771}    Kisha Berrios is a 43 year old, presenting for the following health issues:  Follow Up and Travel Clinic      3/6/2024     8:01 AM   Additional Questions   Roomed by DM     History of Present Illness       Reason for visit:  Travel purpose    She eats 4 or more servings of fruits and vegetables daily.She consumes 1 sweetened beverage(s) daily.She exercises with enough effort to increase her heart rate 30 to 60 minutes per day.  She exercises with enough effort to increase her heart rate 3 or less days per week.   She is taking medications regularly.       {MA/LPN/RN Pre-Provider Visit Orders- hCG/UA/Strep (Optional):593555}  {SUPERLIST (Optional):305008}  {additonal problems for provider to add (Optional):417161}    {ROS Picklists (Optional):240159}      Objective    There were no vitals taken for this visit.  There is no height or weight on file to calculate BMI.  Physical Exam   {Exam List (Optional):134306}    {Diagnostic Test Results (Optional):038868}        Signed Electronically by: Aleyda Briggs MD  {Email feedback regarding this note to [email or web address]   :884066}

## 2024-03-06 NOTE — PROGRESS NOTES
"  Assessment & Plan     Travel advice encounter  -Travel to [place] for 1 month  - azithromycin (ZITHROMAX) 500 MG tablet  Dispense: 3 tablet; Refill: 0  - atovaquone-proguanil (MALARONE) 250-100 MG tablet  Dispense: 39 tablet; Refill: 0  -yellow fever vaccine done 9/12/22  -Typhoid vaccine done 9/12/22   -Tdap is up tot date, done 12/22/21    Essential hypertension  -checks blood pressure at home  -continue current medication     Mammogram declined  -declined     Cervical cancer screening declined  -declined     Encounter for immunization  - HEPATITIS B, ADULT 20+ (ENGERIX-B/RECOMBIVAX HB)  - INFLUENZA VACCINE IM > 6 MONTHS VALENT IIV4 (AFLURIA/FLUZONE)  - HEPATITIS A 19+ (HAVRIX/VAQTA)      Review of external notes as documented elsewhere in note  Prescription drug management  20 minutes spent by me on the date of the encounter doing chart review, history and exam, documentation and further activities per the note      BMI  Estimated body mass index is 41.51 kg/m  as calculated from the following:    Height as of this encounter: 1.727 m (5' 8\").    Weight as of this encounter: 123.8 kg (273 lb).       Kisha Berrios is a 43 year old, presenting for the following health issues:  Travel Clinic        3/6/2024     8:01 AM   Additional Questions   Roomed by DM     History of Present Illness       Reason for visit:  Travel purpose    She eats 4 or more servings of fruits and vegetables daily.She consumes 1 sweetened beverage(s) daily.She exercises with enough effort to increase her heart rate 30 to 60 minutes per day.  She exercises with enough effort to increase her heart rate 3 or less days per week.   She is taking medications regularly.     Saw Travel clinic at [facility] last year and got Yellow fever vaccine    TRAVEL PLANS  Time in developing countries: 30 days.   Countries of travel: [place] to [facility]-( [place] entire trip)  Areas in country: urban   Traveling to Malarial area:Yes, Time in Malaria area " "29 days.   Reviewed Malarial risk map: yes  Prior malarial chemoprophylaxis use: Yes used Malarone without side effects   Traveling to [facility] area: yes  Accommodations: private home  Purpose of travel: visiting friends and relatives (VFR)     Concern - Traveling to [facility] on March 16, 2024 for 1 month.    Blood pressure at home 130/80  Medication regular use   No side effects of medication     Review of Systems  Constitutional, HEENT, cardiovascular, pulmonary, gi and gu systems are negative, except as otherwise noted.      Objective    /85   Pulse 79   Temp 98  F (36.7  C)   Resp 20   Ht 1.727 m (5' 8\")   Wt 123.8 kg (273 lb)   LMP 02/13/2024   SpO2 100%   BMI 41.51 kg/m    Body mass index is 41.51 kg/m .  Physical Exam   GENERAL APPEARANCE: healthy, alert and no distress  EYES: Eyes grossly normal to inspection and conjunctivae and sclerae normal  RESP: lungs clear to auscultation - no rales, rhonchi or wheezes  CV: regular rates and rhythm, normal S1 S2, no S3 or S4 and no murmur, click or rub  ABDOMEN: soft, non-tender and no rebound or guarding   SKIN: no suspicious lesions or rashes  NEURO: Normal strength and tone, mentation intact and speech normal  PSYCH: mentation appears normal      No results found for this or any previous visit (from the past 24 hour(s)).        Signed Electronically by: Aleyda Briggs MD MPH    "

## 2024-03-06 NOTE — NURSING NOTE
Prior to immunization administration, verified patients identity using patient s name and date of birth. Please see Immunization Activity for additional information.     Screening Questionnaire for Adult Immunization    Are you sick today?   No   Do you have allergies to medications, food, a vaccine component or latex?   No   Have you ever had a serious reaction after receiving a vaccination?   No   Do you have a long-term health problem with heart, lung, kidney, or metabolic disease (e.g., diabetes), asthma, a blood disorder, no spleen, complement component deficiency, a cochlear implant, or a spinal fluid leak?  Are you on long-term aspirin therapy?   No   Do you have cancer, leukemia, HIV/AIDS, or any other immune system problem?   No   Do you have a parent, brother, or sister with an immune system problem?   No   In the past 3 months, have you taken medications that affect  your immune system, such as prednisone, other steroids, or anticancer drugs; drugs for the treatment of rheumatoid arthritis, Crohn s disease, or psoriasis; or have you had radiation treatments?   No   Have you had a seizure, or a brain or other nervous system problem?   No   During the past year, have you received a transfusion of blood or blood    products, or been given immune (gamma) globulin or antiviral drug?   No   For women: Are you pregnant or is there a chance you could become       pregnant during the next month?   No   Have you received any vaccinations in the past 4 weeks?   No     Immunization questionnaire answers were all negative.      Patient instructed to remain in clinic for 15 minutes afterwards, and to report any adverse reactions.     Screening performed by Grey Chance MA on 3/6/2024 at 8:50 AM.

## 2024-05-14 DIAGNOSIS — I10 ESSENTIAL HYPERTENSION: ICD-10-CM

## 2024-05-14 RX ORDER — AMLODIPINE BESYLATE 10 MG/1
10 TABLET ORAL DAILY
Qty: 90 TABLET | Refills: 2 | Status: SHIPPED | OUTPATIENT
Start: 2024-05-14

## 2024-07-14 ENCOUNTER — HEALTH MAINTENANCE LETTER (OUTPATIENT)
Age: 44
End: 2024-07-14

## 2025-02-26 DIAGNOSIS — I10 ESSENTIAL HYPERTENSION: ICD-10-CM

## 2025-02-26 RX ORDER — AMLODIPINE BESYLATE 10 MG/1
10 TABLET ORAL DAILY
Qty: 90 TABLET | Refills: 0 | Status: SHIPPED | OUTPATIENT
Start: 2025-02-26

## 2025-02-26 NOTE — TELEPHONE ENCOUNTER
Called and left a voicemail message to return our call to schedule a follow up appointment.  Lin Nielsen MA/  [facility]   Primary Care

## 2025-07-19 ENCOUNTER — HEALTH MAINTENANCE LETTER (OUTPATIENT)
Age: 45
End: 2025-07-19

## 2025-08-07 ENCOUNTER — VIRTUAL VISIT (OUTPATIENT)
Dept: FAMILY MEDICINE | Facility: CLINIC | Age: 45
End: 2025-08-07

## 2025-08-07 ENCOUNTER — ORDERS ONLY (AUTO-RELEASED) (OUTPATIENT)
Dept: FAMILY MEDICINE | Facility: CLINIC | Age: 45
End: 2025-08-07

## 2025-08-07 DIAGNOSIS — Z12.11 SCREEN FOR COLON CANCER: ICD-10-CM

## 2025-08-07 DIAGNOSIS — Z12.31 VISIT FOR SCREENING MAMMOGRAM: ICD-10-CM

## 2025-08-07 DIAGNOSIS — I10 ESSENTIAL HYPERTENSION: Primary | ICD-10-CM

## 2025-08-07 RX ORDER — AMLODIPINE BESYLATE 10 MG/1
10 TABLET ORAL DAILY
Qty: 90 TABLET | Refills: 1 | Status: SHIPPED | OUTPATIENT
Start: 2025-08-07

## (undated) DEVICE — BLADE SAW SAGITTAL STRK 12.5X81.5X1.27MM 4/2000 2108-158-000

## (undated) DEVICE — DRSG ABDOMINAL 07 1/2X8" 7197D

## (undated) DEVICE — SOL NACL 0.9% IRRIG 500ML BOTTLE 2F7123

## (undated) DEVICE — GLOVE PROTEXIS MICRO 6.5  2D73PM65

## (undated) DEVICE — BLADE KNIFE SURG 10 371110

## (undated) DEVICE — SU VICRYL 2-0 CT-2 27" UND J269H

## (undated) DEVICE — GLOVE PROTEXIS POWDER FREE SMT 7.5  2D72PT75X

## (undated) DEVICE — IMM LEG ELEVATOR 79-90191

## (undated) DEVICE — BLADE SAW SAGITTAL STRK 20.7X85X0.89MM 2108-109-000S13

## (undated) DEVICE — GOWN XLG DISP 9545

## (undated) DEVICE — SU ETHILON 3-0 PS-1 18" 1663H

## (undated) DEVICE — PACK LOWER EXTREMITY CUSTOM ASC

## (undated) DEVICE — SUCTION MANIFOLD NEPTUNE 2 SYS 1 PORT 702-025-000

## (undated) DEVICE — PREP CHLORAPREP 26ML TINTED ORANGE  260815

## (undated) DEVICE — CAST PADDING 4" UNSTERILE 9044

## (undated) DEVICE — ESU GROUND PAD ADULT W/CORD E7507

## (undated) DEVICE — CAST PLASTER SPLINT 5X30" 7395

## (undated) DEVICE — GLOVE PROTEXIS BLUE W/NEU-THERA 8.0  2D73EB80

## (undated) DEVICE — LINEN GOWN XLG 5407

## (undated) DEVICE — SOL WATER IRRIG 500ML BOTTLE 2F7113

## (undated) DEVICE — GLOVE PROTEXIS BLUE W/NEU-THERA 7.0  2D73EB70

## (undated) DEVICE — LINEN ORTHO PACK 5446

## (undated) DEVICE — BNDG ELASTIC 4"X5YDS UNSTERILE 6611-40

## (undated) DEVICE — BLADE SAW SAGITTAL STRK XSHORT 25X9.5X0.6MM 2108-145-000

## (undated) DEVICE — TOURNIQUET SGL BLADDER 44"X4" NAVY 5921-044-135

## (undated) DEVICE — IMPLANTABLE DEVICE
Type: IMPLANTABLE DEVICE | Site: LEG | Status: NON-FUNCTIONAL
Removed: 2021-05-12

## (undated) DEVICE — BNDG ELASTIC 4" DBL LENGTH UNSTERILE 6611-14

## (undated) RX ORDER — HYDROXYZINE HYDROCHLORIDE 25 MG/1
TABLET, FILM COATED ORAL
Status: DISPENSED
Start: 2021-05-12

## (undated) RX ORDER — ONDANSETRON 2 MG/ML
INJECTION INTRAMUSCULAR; INTRAVENOUS
Status: DISPENSED
Start: 2021-05-12

## (undated) RX ORDER — HYDROMORPHONE HYDROCHLORIDE 1 MG/ML
INJECTION, SOLUTION INTRAMUSCULAR; INTRAVENOUS; SUBCUTANEOUS
Status: DISPENSED
Start: 2021-12-08

## (undated) RX ORDER — ACETAMINOPHEN 325 MG/1
TABLET ORAL
Status: DISPENSED
Start: 2021-05-12

## (undated) RX ORDER — DEXAMETHASONE SODIUM PHOSPHATE 4 MG/ML
INJECTION, SOLUTION INTRA-ARTICULAR; INTRALESIONAL; INTRAMUSCULAR; INTRAVENOUS; SOFT TISSUE
Status: DISPENSED
Start: 2021-12-08

## (undated) RX ORDER — CEFAZOLIN SODIUM 1 G/3ML
INJECTION, POWDER, FOR SOLUTION INTRAMUSCULAR; INTRAVENOUS
Status: DISPENSED
Start: 2021-05-12

## (undated) RX ORDER — FENTANYL CITRATE 50 UG/ML
INJECTION, SOLUTION INTRAMUSCULAR; INTRAVENOUS
Status: DISPENSED
Start: 2021-05-12

## (undated) RX ORDER — KETOROLAC TROMETHAMINE 30 MG/ML
INJECTION, SOLUTION INTRAMUSCULAR; INTRAVENOUS
Status: DISPENSED
Start: 2021-05-12

## (undated) RX ORDER — BUPIVACAINE HYDROCHLORIDE 5 MG/ML
INJECTION, SOLUTION EPIDURAL; INTRACAUDAL
Status: DISPENSED
Start: 2021-12-08

## (undated) RX ORDER — PROPOFOL 10 MG/ML
INJECTION, EMULSION INTRAVENOUS
Status: DISPENSED
Start: 2021-12-08

## (undated) RX ORDER — LIDOCAINE HYDROCHLORIDE 20 MG/ML
INJECTION, SOLUTION EPIDURAL; INFILTRATION; INTRACAUDAL; PERINEURAL
Status: DISPENSED
Start: 2021-05-12

## (undated) RX ORDER — GABAPENTIN 300 MG/1
CAPSULE ORAL
Status: DISPENSED
Start: 2021-05-12

## (undated) RX ORDER — BUPIVACAINE HYDROCHLORIDE 5 MG/ML
INJECTION, SOLUTION EPIDURAL; INTRACAUDAL
Status: DISPENSED
Start: 2021-05-12

## (undated) RX ORDER — OXYCODONE HYDROCHLORIDE 5 MG/1
TABLET ORAL
Status: DISPENSED
Start: 2021-05-12

## (undated) RX ORDER — DEXAMETHASONE SODIUM PHOSPHATE 4 MG/ML
INJECTION, SOLUTION INTRA-ARTICULAR; INTRALESIONAL; INTRAMUSCULAR; INTRAVENOUS; SOFT TISSUE
Status: DISPENSED
Start: 2021-05-12

## (undated) RX ORDER — CEFAZOLIN SODIUM 2 G/50ML
SOLUTION INTRAVENOUS
Status: DISPENSED
Start: 2021-12-08

## (undated) RX ORDER — PROPOFOL 10 MG/ML
INJECTION, EMULSION INTRAVENOUS
Status: DISPENSED
Start: 2021-05-12

## (undated) RX ORDER — ACETAMINOPHEN 325 MG/1
TABLET ORAL
Status: DISPENSED
Start: 2021-12-08

## (undated) RX ORDER — ONDANSETRON 2 MG/ML
INJECTION INTRAMUSCULAR; INTRAVENOUS
Status: DISPENSED
Start: 2021-12-08

## (undated) RX ORDER — KETOROLAC TROMETHAMINE 30 MG/ML
INJECTION, SOLUTION INTRAMUSCULAR; INTRAVENOUS
Status: DISPENSED
Start: 2021-12-08

## (undated) RX ORDER — LIDOCAINE HYDROCHLORIDE 20 MG/ML
INJECTION, SOLUTION EPIDURAL; INFILTRATION; INTRACAUDAL; PERINEURAL
Status: DISPENSED
Start: 2021-12-08

## (undated) RX ORDER — OXYCODONE HYDROCHLORIDE 5 MG/1
TABLET ORAL
Status: DISPENSED
Start: 2021-12-08